# Patient Record
Sex: MALE | Race: BLACK OR AFRICAN AMERICAN | Employment: UNEMPLOYED | URBAN - METROPOLITAN AREA
[De-identification: names, ages, dates, MRNs, and addresses within clinical notes are randomized per-mention and may not be internally consistent; named-entity substitution may affect disease eponyms.]

---

## 2021-11-09 ENCOUNTER — HOSPITAL ENCOUNTER (INPATIENT)
Age: 31
LOS: 18 days | Discharge: HOME | DRG: 885 | End: 2021-11-27
Attending: EMERGENCY MEDICINE | Admitting: PSYCHIATRY & NEUROLOGY
Payer: MEDICAID

## 2021-11-09 DIAGNOSIS — F25.9 SCHIZOAFFECTIVE DISORDER, UNSPECIFIED TYPE (HCC): Primary | ICD-10-CM

## 2021-11-09 DIAGNOSIS — F31.2 BIPOLAR DISORDER, CURRENT EPISODE MANIC SEVERE WITH PSYCHOTIC FEATURES (HCC): ICD-10-CM

## 2021-11-09 LAB
ALBUMIN SERPL-MCNC: 4 G/DL (ref 3.5–5)
ALBUMIN/GLOB SERPL: 1.3 {RATIO} (ref 1.1–2.2)
ALP SERPL-CCNC: 84 U/L (ref 45–117)
ALT SERPL-CCNC: 24 U/L (ref 12–78)
AMPHET UR QL SCN: NEGATIVE
ANION GAP SERPL CALC-SCNC: 9 MMOL/L (ref 5–15)
APPEARANCE UR: CLEAR
AST SERPL-CCNC: 23 U/L (ref 15–37)
BACTERIA URNS QL MICRO: ABNORMAL /HPF
BARBITURATES UR QL SCN: NEGATIVE
BASOPHILS # BLD: 0 K/UL (ref 0–0.1)
BASOPHILS NFR BLD: 1 % (ref 0–1)
BENZODIAZ UR QL: NEGATIVE
BILIRUB SERPL-MCNC: 0.6 MG/DL (ref 0.2–1)
BILIRUB UR QL: NEGATIVE
BUN SERPL-MCNC: 14 MG/DL (ref 6–20)
BUN/CREAT SERPL: 15 (ref 12–20)
CALCIUM SERPL-MCNC: 8.6 MG/DL (ref 8.5–10.1)
CANNABINOIDS UR QL SCN: NEGATIVE
CHLORIDE SERPL-SCNC: 106 MMOL/L (ref 97–108)
CO2 SERPL-SCNC: 28 MMOL/L (ref 21–32)
COCAINE UR QL SCN: NEGATIVE
COLOR UR: ABNORMAL
CREAT SERPL-MCNC: 0.93 MG/DL (ref 0.7–1.3)
DIFFERENTIAL METHOD BLD: ABNORMAL
DRUG SCRN COMMENT,DRGCM: NORMAL
EOSINOPHIL # BLD: 0.1 K/UL (ref 0–0.4)
EOSINOPHIL NFR BLD: 2 % (ref 0–7)
EPITH CASTS URNS QL MICRO: ABNORMAL /LPF
ERYTHROCYTE [DISTWIDTH] IN BLOOD BY AUTOMATED COUNT: 13.4 % (ref 11.5–14.5)
ETHANOL SERPL-MCNC: <10 MG/DL
FLUAV RNA SPEC QL NAA+PROBE: NOT DETECTED
FLUBV RNA SPEC QL NAA+PROBE: NOT DETECTED
GLOBULIN SER CALC-MCNC: 3.2 G/DL (ref 2–4)
GLUCOSE SERPL-MCNC: 94 MG/DL (ref 65–100)
GLUCOSE UR STRIP.AUTO-MCNC: NEGATIVE MG/DL
HCT VFR BLD AUTO: 41.1 % (ref 36.6–50.3)
HGB BLD-MCNC: 13.3 G/DL (ref 12.1–17)
HGB UR QL STRIP: NEGATIVE
IMM GRANULOCYTES # BLD AUTO: 0 K/UL (ref 0–0.04)
IMM GRANULOCYTES NFR BLD AUTO: 0 % (ref 0–0.5)
KETONES UR QL STRIP.AUTO: ABNORMAL MG/DL
LEUKOCYTE ESTERASE UR QL STRIP.AUTO: NEGATIVE
LYMPHOCYTES # BLD: 3.7 K/UL (ref 0.8–3.5)
LYMPHOCYTES NFR BLD: 49 % (ref 12–49)
MCH RBC QN AUTO: 27 PG (ref 26–34)
MCHC RBC AUTO-ENTMCNC: 32.4 G/DL (ref 30–36.5)
MCV RBC AUTO: 83.4 FL (ref 80–99)
METHADONE UR QL: NEGATIVE
MONOCYTES # BLD: 0.5 K/UL (ref 0–1)
MONOCYTES NFR BLD: 7 % (ref 5–13)
MUCOUS THREADS URNS QL MICRO: ABNORMAL /LPF
NEUTS SEG # BLD: 3.1 K/UL (ref 1.8–8)
NEUTS SEG NFR BLD: 41 % (ref 32–75)
NITRITE UR QL STRIP.AUTO: NEGATIVE
NRBC # BLD: 0 K/UL (ref 0–0.01)
NRBC BLD-RTO: 0 PER 100 WBC
OPIATES UR QL: NEGATIVE
PCP UR QL: NEGATIVE
PH UR STRIP: 5.5 [PH] (ref 5–8)
PLATELET # BLD AUTO: 200 K/UL (ref 150–400)
PMV BLD AUTO: 10.6 FL (ref 8.9–12.9)
POTASSIUM SERPL-SCNC: 3.4 MMOL/L (ref 3.5–5.1)
PROT SERPL-MCNC: 7.2 G/DL (ref 6.4–8.2)
PROT UR STRIP-MCNC: NEGATIVE MG/DL
RBC # BLD AUTO: 4.93 M/UL (ref 4.1–5.7)
RBC #/AREA URNS HPF: ABNORMAL /HPF (ref 0–5)
SARS-COV-2, COV2: NOT DETECTED
SODIUM SERPL-SCNC: 143 MMOL/L (ref 136–145)
SP GR UR REFRACTOMETRY: >1.03 (ref 1–1.03)
UA: UC IF INDICATED,UAUC: ABNORMAL
UROBILINOGEN UR QL STRIP.AUTO: 1 EU/DL (ref 0.2–1)
WBC # BLD AUTO: 7.5 K/UL (ref 4.1–11.1)
WBC URNS QL MICRO: ABNORMAL /HPF (ref 0–4)

## 2021-11-09 PROCEDURE — 99284 EMERGENCY DEPT VISIT MOD MDM: CPT

## 2021-11-09 PROCEDURE — 80053 COMPREHEN METABOLIC PANEL: CPT

## 2021-11-09 PROCEDURE — 82077 ASSAY SPEC XCP UR&BREATH IA: CPT

## 2021-11-09 PROCEDURE — 80307 DRUG TEST PRSMV CHEM ANLYZR: CPT

## 2021-11-09 PROCEDURE — 81001 URINALYSIS AUTO W/SCOPE: CPT

## 2021-11-09 PROCEDURE — 85025 COMPLETE CBC W/AUTO DIFF WBC: CPT

## 2021-11-09 PROCEDURE — 87636 SARSCOV2 & INF A&B AMP PRB: CPT

## 2021-11-09 PROCEDURE — 36415 COLL VENOUS BLD VENIPUNCTURE: CPT

## 2021-11-09 PROCEDURE — 65270000029 HC RM PRIVATE

## 2021-11-10 PROBLEM — F31.2 BIPOLAR DISORDER, CURRENT EPISODE MANIC SEVERE WITH PSYCHOTIC FEATURES (HCC): Status: ACTIVE | Noted: 2021-11-10

## 2021-11-10 PROBLEM — F25.9 SCHIZOAFFECTIVE DISORDER (HCC): Status: RESOLVED | Noted: 2021-11-09 | Resolved: 2021-11-10

## 2021-11-10 PROCEDURE — 65220000003 HC RM SEMIPRIVATE PSYCH

## 2021-11-10 PROCEDURE — 99223 1ST HOSP IP/OBS HIGH 75: CPT | Performed by: PSYCHIATRY & NEUROLOGY

## 2021-11-10 RX ORDER — ADHESIVE BANDAGE
30 BANDAGE TOPICAL DAILY PRN
Status: DISCONTINUED | OUTPATIENT
Start: 2021-11-10 | End: 2021-11-27 | Stop reason: HOSPADM

## 2021-11-10 RX ORDER — ARIPIPRAZOLE 1 MG/ML
10 SOLUTION ORAL DAILY
Status: DISCONTINUED | OUTPATIENT
Start: 2021-11-10 | End: 2021-11-12

## 2021-11-10 RX ORDER — DIPHENHYDRAMINE HYDROCHLORIDE 50 MG/ML
50 INJECTION, SOLUTION INTRAMUSCULAR; INTRAVENOUS
Status: DISCONTINUED | OUTPATIENT
Start: 2021-11-10 | End: 2021-11-27 | Stop reason: HOSPADM

## 2021-11-10 RX ORDER — TRAZODONE HYDROCHLORIDE 50 MG/1
50 TABLET ORAL
Status: DISCONTINUED | OUTPATIENT
Start: 2021-11-10 | End: 2021-11-21

## 2021-11-10 RX ORDER — OLANZAPINE 5 MG/1
5 TABLET ORAL
Status: DISCONTINUED | OUTPATIENT
Start: 2021-11-10 | End: 2021-11-27 | Stop reason: HOSPADM

## 2021-11-10 RX ORDER — ACETAMINOPHEN 325 MG/1
650 TABLET ORAL
Status: DISCONTINUED | OUTPATIENT
Start: 2021-11-10 | End: 2021-11-27 | Stop reason: HOSPADM

## 2021-11-10 RX ORDER — VALPROIC ACID 250 MG/5ML
750 SOLUTION ORAL EVERY 12 HOURS
Status: DISCONTINUED | OUTPATIENT
Start: 2021-11-10 | End: 2021-11-12

## 2021-11-10 RX ORDER — HALOPERIDOL 5 MG/ML
5 INJECTION INTRAMUSCULAR
Status: DISCONTINUED | OUTPATIENT
Start: 2021-11-10 | End: 2021-11-27 | Stop reason: HOSPADM

## 2021-11-10 RX ORDER — LORAZEPAM 2 MG/ML
1 INJECTION INTRAMUSCULAR
Status: DISCONTINUED | OUTPATIENT
Start: 2021-11-10 | End: 2021-11-27 | Stop reason: HOSPADM

## 2021-11-10 RX ORDER — HYDROXYZINE 25 MG/1
50 TABLET, FILM COATED ORAL
Status: DISCONTINUED | OUTPATIENT
Start: 2021-11-10 | End: 2021-11-27 | Stop reason: HOSPADM

## 2021-11-10 RX ORDER — BENZTROPINE MESYLATE 1 MG/1
1 TABLET ORAL
Status: DISCONTINUED | OUTPATIENT
Start: 2021-11-10 | End: 2021-11-27 | Stop reason: HOSPADM

## 2021-11-10 NOTE — BH NOTES
Report received from off going nurse, assumed care of resident. Pt is alert to person only, he states that he has no idea why he was brought into the hospital. PT is aggressive and hostile. He requires redirection for yelling when approached. Pt denies SI/HI, AVH, depression and anxiety. 1000-Pt is currently pacing in the hallway. Pt ignores everyone who asks him questions. No other issues to note at this time. 1200-Pt refused medication, information was given on med compliance. 1400-Pt is meal compliant, Still irritable and labile. Monitoring will continue    1600-Pacing in hallway, denies all pain and discomfort. Will continue to monitor.

## 2021-11-10 NOTE — PROGRESS NOTES
Behavioral Services  Medicare Certification Upon Admission    I certify that this patient's inpatient psychiatric hospital admission is medically necessary for:    [x] (1) Treatment which could reasonably be expected to improve this patient's condition,       [x] (2) Or for diagnostic study;     AND     [x](2) The inpatient psychiatric services are provided while the individual is under the care of a physician and are included in the individualized plan of care.     Estimated length of stay/service 5-7 days    Plan for post-hospital care home    Electronically signed by Mala Cervantes MD on 11/10/2021 at 8:41 AM

## 2021-11-10 NOTE — BH NOTES
GROUP THERAPY PROGRESS NOTE    Patient did not participate in Coping Skills group.      TAMY Stevenson

## 2021-11-10 NOTE — ED NOTES
Pt belongings inventory:    -2 debit cards  -1 family first card  -[employer] car keys  -Empty pack of cigarretes  -2 lighters  -0.25 cents  -CPD paperwork    -Black tank top  -Black Fraternity shirt  -Black Hoodie  -Black athletic pants  -Sneakers

## 2021-11-10 NOTE — PROGRESS NOTES
Problem: Psychosis  Goal: *STG: Remains safe in hospital  Outcome: Progressing Towards Goal  Goal: *STG/LTG: Complies with medication therapy  11/10/2021 0951 by Lindsay Fowler RN  Outcome: Progressing Towards Goal  11/10/2021 0950 by Lindsay Fowler RN  Outcome: Progressing Towards Goal

## 2021-11-10 NOTE — GROUP NOTE
SERA  GROUP DOCUMENTATION INDIVIDUAL                                                                          Group Therapy Note    Date: 11/10/2021    Group Start Time: 1000  Group End Time: 1100  Group Topic: Topic Group    [facility] [place] [address] ACUTE BEHAV [facility]    [place], [address] GROUP    Group Therapy Note    Attendees: 6         Attendance: Did not attend    Patient's Goal:      Interventions/techniques:  Reather Seal

## 2021-11-10 NOTE — PROGRESS NOTES
Patient is an 27year old  male admitted to acute unit with a primary diagnosis of Schizoaffective disorder. Patient admitted under an TDO admission. Patient is oriented to self only. Patient presented as guarded with limited engagement. Patient denied any symptoms of depression or anxiety. Patient denied hallucinations of any type and/or thoughts of self harm or harm to others. Patient admitted to due to delusional thought process. According to intake documentation, patient was pulled over by the police after calls of a suspicious vehicle. Patient identified himself as his mother and stated that he was traveling in to [place]. Patient unable to conceptualize that his car was out of gas. Patient has an documented hx of Schizophrenia. Patient has been arrested by [employer] for his attempts to visit the president. Patient's UDS negative of all substances. BAL 0.000. Patient uncooperative during admission process. Patient unable to answer detailed admission questions. Patient oriented to unit. Admission packet and confidentiality code located in patient chart. Patient unable to sign admission documentation during intake. Admission documentation located in chart. Q 15 min checks initiated for safety. Staff will continue to assess and monitor.         Problem: Psychosis  Goal: *STG: Decreased hallucinations  Outcome: Progressing Towards Goal  Goal: *STG: Decreased delusional thinking  Outcome: Progressing Towards Goal  Goal: *STG: Remains safe in hospital  Outcome: Progressing Towards Goal

## 2021-11-10 NOTE — GROUP NOTE
SERA  GROUP DOCUMENTATION INDIVIDUAL                                                                          Group Therapy Note    Date: 11/10/2021    Group Start Time: 1400  Group End Time: 1500  Group Topic: Recreational/Music Therapy    [facility] [place] [address] ACUTE BEHAV [facility]    [place], [address] [facility] GROUP DOCUMENTATION GROUP    Group Therapy Note    Attendees: 8         Attendance: Attended    Patient's Goal:  To concentrate on selected task    Interventions/techniques: Supported-crafts,games,music    Interactions: Minimal    Mental Status: Flat and Preoccupied    Behavior/appearance: Needed prompting and Poor eye contact    Goals Achieved:        Additional Notes:  Quiet/withdrawn-declined active participation-left early    Nancy Martinez

## 2021-11-10 NOTE — PROGRESS NOTES
Laboratory monitoring for mood stabilizer and antipsychotics:    Recommended baseline monitoring has been completed based on this patient's current medication regimen. The patient is currently taking the following medication(s):   Current Facility-Administered Medications   Medication Dose Route Frequency    ARIPiprazole (ABILIFY) 1 mg/mL oral soln 15 mg  15 mg Oral DAILY    Or    haloperidol lactate (HALDOL) injection 5 mg +++COURT ORDERED MEDICATION FOR REFUSAL OF ORAL ARIPIPRAZOLE+++  5 mg IntraMUSCular DAILY    valproic acid (as sodium salt) (DEPAKENE) 250 mg/5 mL (5 mL) oral solution 750 mg  750 mg Oral Q12H    Or    LORazepam (ATIVAN) injection 1 mg +++COURT ORDERED MEDICATION FOR REFUSAL OF VALPROIC ACID+++  1 mg IntraMUSCular Q12H       Serum Drug Level(s)  VALPROIC ACID  Recent Labs     11/17/21  0546   VALP 82         Height, Weight, BMI Estimation  Estimated body mass index is 27.12 kg/m² as calculated from the following:    Height as of this encounter: 182.9 cm (72\"). Weight as of this encounter: 90.7 kg (200 lb). Renal Function, Hepatic Function and Chemistry  Estimated Creatinine Clearance: 125.2 mL/min (by C-G formula based on SCr of 0.93 mg/dL). Lab Results   Component Value Date/Time    Sodium 143 11/09/2021 08:31 PM    Potassium 3.4 (L) 11/09/2021 08:31 PM    Chloride 106 11/09/2021 08:31 PM    CO2 28 11/09/2021 08:31 PM    Anion gap 9 11/09/2021 08:31 PM    Glucose 94 11/09/2021 08:31 PM    BUN 14 11/09/2021 08:31 PM    Creatinine 0.93 11/09/2021 08:31 PM    BUN/Creatinine ratio 15 11/09/2021 08:31 PM    GFR est AA >60 11/09/2021 08:31 PM    GFR est non-AA >60 11/09/2021 08:31 PM    Calcium 8.6 11/09/2021 08:31 PM    ALT (SGPT) 24 11/09/2021 08:31 PM    Alk.  phosphatase 84 11/09/2021 08:31 PM    Protein, total 7.2 11/09/2021 08:31 PM    Albumin 4.0 11/09/2021 08:31 PM    Globulin 3.2 11/09/2021 08:31 PM    A-G Ratio 1.3 11/09/2021 08:31 PM    Bilirubin, total 0.6 11/09/2021 08:31 PM       Lab Results   Component Value Date/Time    Glucose 94 11/09/2021 08:31 PM       Lab Results   Component Value Date/Time    Hemoglobin A1c 5.5 11/17/2021 05:48 AM       Hematology  Lab Results   Component Value Date/Time    WBC 7.5 11/09/2021 08:31 PM    HGB 13.3 11/09/2021 08:31 PM    HCT 41.1 11/09/2021 08:31 PM    PLATELET 874 60/52/5749 08:31 PM    MCV 83.4 11/09/2021 08:31 PM       Lipids  Lab Results   Component Value Date/Time    Cholesterol, total 196 11/17/2021 05:46 AM    HDL Cholesterol 54 11/17/2021 05:46 AM    LDL, calculated 122 (H) 11/17/2021 05:46 AM    Triglyceride 100 11/17/2021 05:46 AM    CHOL/HDL Ratio 3.6 11/17/2021 05:46 AM       Thyroid Function  Lab Results   Component Value Date/Time    TSH 1.18 11/17/2021 05:46 AM       Vitals  Visit Vitals  /75 (BP 1 Location: Left upper arm, BP Patient Position: Sitting)   Pulse 81   Temp 97.7 °F (36.5 °C)   Resp 17   Ht 182.9 cm (72\")   Wt 90.7 kg (200 lb)   SpO2 99%   BMI 27.12 kg/m²       Svetlana Cruz, [address], BCPS  [phone] (pharmacy)

## 2021-11-10 NOTE — ED NOTES
Per Bob LOUIS, Pt car is located at [address] [place] [address] [facility] in front of a residence. Homeowners are aware.

## 2021-11-10 NOTE — ED PROVIDER NOTES
EMERGENCY DEPARTMENT HISTORY AND PHYSICAL EXAM      Date: 11/9/2021  Patient Name: Jackeline Lorenzana    History of Presenting Illness     Chief Complaint   Patient presents with    Mental Health Problem     ECO with [place] [employer]       History Provided By: Patient    Additional History (Context): Jackeline Lorenzana is a 27 y.o. male with No significant past medical history who presents with mental health problem. Pt states he was pulled over and the next thing he noticed he was in handcuffs. He states nothing is wrong with him medically. Denies hx of psychiatric conditions. Denies delusions, hallucinations ,SI, HI. Pt states he is not on medication. He states \" if society was okay then people would not have psychological conditions\". Per [place] police, pt is a missing person and from [place]. Per pt, he was on his way to [place]. PCP: None        Past History     Past Medical History:  History reviewed. No pertinent past medical history. Past Surgical History:  History reviewed. No pertinent surgical history. Family History:  History reviewed. No pertinent family history. Social History:  Social History     Tobacco Use    Smoking status: Current Some Day Smoker    Smokeless tobacco: Not on file   Substance Use Topics    Alcohol use: Not Currently    Drug use: Not Currently       Allergies:  No Known Allergies      Review of Systems   Review of Systems   Constitutional: Negative for chills and fever. HENT: Negative for congestion, rhinorrhea and sore throat. Respiratory: Negative for cough and shortness of breath. Cardiovascular: Negative for chest pain and leg swelling. Gastrointestinal: Negative for abdominal pain, constipation, diarrhea, nausea and vomiting. Genitourinary: Negative for dysuria, frequency and urgency. Musculoskeletal: Negative for back pain, gait problem and neck pain. Skin: Negative for wound. Neurological: Negative for dizziness, numbness and headaches. Psychiatric/Behavioral: Negative for agitation, confusion, hallucinations, sleep disturbance and suicidal ideas. The patient is not nervous/anxious and is not hyperactive. All other systems reviewed and are negative. Physical Exam     Vitals:    11/09/21 1940   BP: (!) 120/96   Pulse: 62   Resp: 20   Temp: 98.2 °F (36.8 °C)   SpO2: 95%   Weight: 90.7 kg (200 lb)   Height: 6' (1.829 m)     Physical Exam  Vitals and nursing note reviewed. Constitutional:       General: He is not in acute distress. Appearance: He is well-developed. He is not ill-appearing. HENT:      Head: Normocephalic and atraumatic. Right Ear: Tympanic membrane, ear canal and external ear normal.      Left Ear: Tympanic membrane, ear canal and external ear normal.      Nose: Nose normal.      Mouth/Throat:      Mouth: Mucous membranes are moist.      Pharynx: Oropharynx is clear. No oropharyngeal exudate or posterior oropharyngeal erythema. Eyes:      Extraocular Movements: Extraocular movements intact. Conjunctiva/sclera: Conjunctivae normal.      Pupils: Pupils are equal, round, and reactive to light. Cardiovascular:      Rate and Rhythm: Normal rate and regular rhythm. Pulses: Normal pulses. Heart sounds: Normal heart sounds. Pulmonary:      Effort: Pulmonary effort is normal.      Breath sounds: Normal breath sounds. Abdominal:      General: Bowel sounds are normal. There is no distension. Palpations: Abdomen is soft. Tenderness: There is no abdominal tenderness. There is no guarding or rebound. Musculoskeletal:      Cervical back: Normal range of motion and neck supple. Lymphadenopathy:      Cervical: No cervical adenopathy. Skin:     General: Skin is warm and dry. Neurological:      Mental Status: He is alert and oriented to person, place, and time. GCS: GCS eye subscore is 4. GCS verbal subscore is 5. GCS motor subscore is 6. Cranial Nerves: No cranial nerve deficit. Psychiatric:         Attention and Perception: Attention and perception normal.         Mood and Affect: Mood and affect normal.         Speech: Speech normal.         Behavior: Behavior is agitated. Behavior is cooperative. Thought Content: Thought content normal.           Diagnostic Study Results     Labs -     Recent Results (from the past 12 hour(s))   CBC WITH AUTOMATED DIFF    Collection Time: 11/09/21  8:31 PM   Result Value Ref Range    WBC 7.5 4.1 - 11.1 K/uL    RBC 4.93 4.10 - 5.70 M/uL    HGB 13.3 12.1 - 17.0 g/dL    HCT 41.1 36.6 - 50.3 %    MCV 83.4 80.0 - 99.0 FL    MCH 27.0 26.0 - 34.0 PG    MCHC 32.4 30.0 - 36.5 g/dL    RDW 13.4 11.5 - 14.5 %    PLATELET 165 402 - 602 K/uL    MPV 10.6 8.9 - 12.9 FL    NRBC 0.0 0  WBC    ABSOLUTE NRBC 0.00 0.00 - 0.01 K/uL    NEUTROPHILS 41 32 - 75 %    LYMPHOCYTES 49 12 - 49 %    MONOCYTES 7 5 - 13 %    EOSINOPHILS 2 0 - 7 %    BASOPHILS 1 0 - 1 %    IMMATURE GRANULOCYTES 0 0.0 - 0.5 %    ABS. NEUTROPHILS 3.1 1.8 - 8.0 K/UL    ABS. LYMPHOCYTES 3.7 (H) 0.8 - 3.5 K/UL    ABS. MONOCYTES 0.5 0.0 - 1.0 K/UL    ABS. EOSINOPHILS 0.1 0.0 - 0.4 K/UL    ABS. BASOPHILS 0.0 0.0 - 0.1 K/UL    ABS. IMM. GRANS. 0.0 0.00 - 0.04 K/UL    DF AUTOMATED     METABOLIC PANEL, COMPREHENSIVE    Collection Time: 11/09/21  8:31 PM   Result Value Ref Range    Sodium 143 136 - 145 mmol/L    Potassium 3.4 (L) 3.5 - 5.1 mmol/L    Chloride 106 97 - 108 mmol/L    CO2 28 21 - 32 mmol/L    Anion gap 9 5 - 15 mmol/L    Glucose 94 65 - 100 mg/dL    BUN 14 6 - 20 MG/DL    Creatinine 0.93 0.70 - 1.30 MG/DL    BUN/Creatinine ratio 15 12 - 20      GFR est AA >60 >60 ml/min/1.73m2    GFR est non-AA >60 >60 ml/min/1.73m2    Calcium 8.6 8.5 - 10.1 MG/DL    Bilirubin, total 0.6 0.2 - 1.0 MG/DL    ALT (SGPT) 24 12 - 78 U/L    AST (SGOT) 23 15 - 37 U/L    Alk.  phosphatase 84 45 - 117 U/L    Protein, total 7.2 6.4 - 8.2 g/dL    Albumin 4.0 3.5 - 5.0 g/dL    Globulin 3.2 2.0 - 4.0 g/dL    A-G Ratio 1.3 1.1 - 2.2     ETHYL ALCOHOL    Collection Time: 11/09/21  8:31 PM   Result Value Ref Range    ALCOHOL(ETHYL),SERUM <10 <10 MG/DL   COVID-19 WITH INFLUENZA A/B    Collection Time: 11/09/21  8:42 PM   Result Value Ref Range    SARS-CoV-2 Not detected NOTD      Influenza A by PCR Not detected      Influenza B by PCR Not detected     DRUG SCREEN, URINE    Collection Time: 11/09/21 10:06 PM   Result Value Ref Range    AMPHETAMINES Negative NEG      BARBITURATES Negative NEG      BENZODIAZEPINES Negative NEG      COCAINE Negative NEG      METHADONE Negative NEG      OPIATES Negative NEG      PCP(PHENCYCLIDINE) Negative NEG      THC (TH-CANNABINOL) Negative NEG      Drug screen comment (NOTE)    URINALYSIS W/ REFLEX CULTURE    Collection Time: 11/09/21 10:06 PM    Specimen: Urine   Result Value Ref Range    Color DARK YELLOW      Appearance CLEAR CLEAR      Specific gravity >1.030 (H) 1.003 - 1.030    pH (UA) 5.5 5.0 - 8.0      Protein Negative NEG mg/dL    Glucose Negative NEG mg/dL    Ketone TRACE (A) NEG mg/dL    Bilirubin Negative NEG      Blood Negative NEG      Urobilinogen 1.0 0.2 - 1.0 EU/dL    Nitrites Negative NEG      Leukocyte Esterase Negative NEG      WBC 0-4 0 - 4 /hpf    RBC 0-5 0 - 5 /hpf    Epithelial cells FEW FEW /lpf    Bacteria 1+ (A) NEG /hpf    UA:UC IF INDICATED CULTURE NOT INDICATED BY UA RESULT CNI      Mucus 2+ (A) NEG /lpf       Radiologic Studies -   No orders to display     CT Results  (Last 48 hours)    None        CXR Results  (Last 48 hours)    None            Medical Decision Making   I am the first provider for this patient. I reviewed the vital signs, available nursing notes, past medical history, past surgical history, family history and social history. Vital Signs-Reviewed the patient's vital signs. Records Reviewed: Nursing Notes and Old Medical Records       ED COURSE:   Initial assessment performed.  The patients presenting problems have been discussed, and they are in agreement with the care plan formulated and outlined with them. I have encouraged them to ask questions as they arise throughout their visit. DDX: Acute Psychosis, Schizophrenia, Bipolar, Substance Abuse   ED Course:   ED Course as of 11/10/21 0037   Tue Nov 09, 2021   2241 Progress Note: pt is medically cleared  [NA]   Wed Nov 10, 2021   0000 Progress Note:   Pt accepted by Dr Luigi Duenas for admission  [NA]      ED Course User Index  [NA] Alfonzo Garcia NP         Disposition:  Admit       Follow-up Information    None         There are no discharge medications for this patient. Provider Notes (Medical Decision Making):     Procedures:  Procedures  Diagnosis     Clinical Impression:   1.  Schizoaffective disorder, unspecified type ([facility] [address]

## 2021-11-10 NOTE — BH NOTES
GROUP THERAPY PROGRESS NOTE    Patient did not participate in Discharge Planning Group.      TAMY Self

## 2021-11-10 NOTE — ED NOTES
TRANSFER - OUT REPORT:    Verbal report given to Ferdinand Dupree RN (name) on Femi Solo  being transferred to [facility] (unit) for routine progression of care       Report consisted of patients Situation, Background, Assessment and   Recommendations(SBAR). Information from the following report(s) SBAR, ED Summary and Recent Results was reviewed with the receiving nurse. Lines:       Opportunity for questions and clarification was provided.       Patient transported with:  Stefan Show

## 2021-11-10 NOTE — BH NOTES
PSYCHOSOCIAL ASSESSMENT  :Patient identifying info:   Agnes Ramirez is a 27 y.o., male admitted 11/9/2021  7:43 PM     Presenting problem and precipitating factors: Pt was admitted on a TDO status and committed during hearing. Per prescreening pt was disoriented, from [place] and was reported as a missing person. Pt states they were visiting family in [place] but were returning to [facility] where they report they live. Pt was a poor historian during th Pt does not give permission for staff to speak with family at this time. Mother provided information that pt was diagnosed with schizophrenia years ago and has a history of treatment and medication noncompliance - most recently on Haldol, Calypta and Depakote. He has a history of cheeking medication and needs a CHOU. Per prescreening, pt has been arrested by [employer] for attempting to visit the President. Mental status assessment: Pt's mood is depressed, affect is blunted. Pt's thought process is illogical. Pt's insight and judgment is poor, reliability is poor. Social work department will continue to coordinate discharge plans. Strengths: Family involvement     Collateral information: Pt refusing to sign DINO    Current psychiatric /substance abuse providers and contact info: None    Previous psychiatric/substance abuse providers and response to treatment: Pt denies. Mother reports multiple psych admissions and history of medication and treatment noncompliance.      Family history of mental illness or substance abuse: unknown     Substance abuse history:  UDS negative; BAL=0  Social History     Tobacco Use    Smoking status: Current Some Day Smoker    Smokeless tobacco: Not on file   Substance Use Topics    Alcohol use: Not Currently       History of biomedical complications associated with substance abuse: none reported    Patient's current acceptance of treatment or motivation for change: Poor at this time    Family constellation: Single and without children     Is significant other involved? N/A    Describe support system: Mother states she is very involved in his life    Describe living arrangements and home environment: Pt reports living alone in [facility]  Never Reviewed          Codes Class Noted POA    * (Principal) [facility] ICD-10-CM: F25.9  ICD-9-CM: 295.70  2021 Unknown            Trauma history: None    Legal issues:  None reported. History of  service:  None reported. Financial status:  Unknown at this time     Sabianism/cultural factors: Unknown     Education/work history: Degree from [place]     Have you been licensed as a health care professional (current or ): No.    Leisure and recreation preferences: Unknown. Describe coping skills:  Unknown at this time.     Lynda Boss  11/10/2021

## 2021-11-10 NOTE — ED NOTES
Pt ambulatory to restroom with [facility]. No restraints in place. Officer did not notify RN that pt needed to use restroom so no urine obtained at this time.

## 2021-11-10 NOTE — ED NOTES
Hourly rounding completed on pt. Pt resting comfortably on stretcher with blanket. No requests at this time. [employer] at bedside.

## 2021-11-10 NOTE — ED NOTES
Pt seemingly agitated with [facility] and repeatedly asking for something to eat. Pt redirectable at this time. This RN explained to pt that we would like to evaluate him first and obtain lab work then we can provide pt with food.

## 2021-11-10 NOTE — PROGRESS NOTES
[facility] Admission Pharmacy Medication Reconciliation     Information obtained from: chart review  RxQuery data available1: No    Comments/recommendations:  Patient is not currently taking any medications. Reported to crisis  that [place] doctor told him he was schizophrenic and prescribed medications that he never took\"  Patient did not respond to treatment team questions regarding any current medications or past treatments. Medication changes (since last review): None     1RxQuery pharmacy benefit data reflects medications filled and processed through the patient's insurance, however                this data does NOT capture whether the medication was picked up or is currently being taken by the patient. Total Time Spent: 5 minutes    Past Medical History/Disease States:  History reviewed. No pertinent past medical history. Patient allergies:    Allergies as of 11/09/2021    (No Known Allergies)       Prior to admission medications:   None        Thank you,  MARVEL Walls [facility] Specialist, [address] [place] [address] [address]: [phone] ([phone]  Pharmacy: [phone]

## 2021-11-10 NOTE — BH NOTES
GROUP THERAPY PROGRESS NOTE    Patient did not participate in Coping Skills group.     Martín Gordon, MSW

## 2021-11-10 NOTE — ED NOTES
Pt presents to ED under ECO with [facility]. Pt is reportedly currently a \"missing person\" from the [facility]. Pt states he is on his way to [place], [place] but had some car trouble en route. Pt is fixated on needing to leave so he can head to [place] stating, \"I have places to go. \" Police reportedly found pt and pt was disoriented to time and place. Pt is cooperative after some prompting but irritable. Pt denies SI/HI/AVTH but was hesitant with answers. Pt denies any ETOH or drug use. [place] [employer] at bedside and pt arrived with forensic restraints applied to bilateral wrists behind pt's back. No skin breakdown noted. Forensic restraints are removed at this time. Pt changed into paper scrubs. [place] provided for comfort. Mother's Contact: [phone]      Emergency Department Nursing Plan of Care       The Nursing Plan of Care is developed from the Nursing assessment and Emergency Department Attending provider initial evaluation. The plan of care may be reviewed in the ED Provider note.     The Plan of Care was developed with the following considerations:   Patient / Family readiness to learn indicated by:verbalized understanding  Persons(s) to be included in education: patient  Barriers to Learning/Limitations:No    Signed     Too Grissom RN    11/9/2021   7:59 PM

## 2021-11-11 PROCEDURE — 99232 SBSQ HOSP IP/OBS MODERATE 35: CPT | Performed by: PSYCHIATRY & NEUROLOGY

## 2021-11-11 PROCEDURE — 65220000003 HC RM SEMIPRIVATE PSYCH

## 2021-11-11 NOTE — GROUP NOTE
SERA  GROUP DOCUMENTATION INDIVIDUAL                                                                          Group Therapy Note    Date: 11/11/2021    Group Start Time: 1000  Group End Time: 1100  Group Topic: Topic Group    [facility] [place] 3 ACUTE BEHAV [facility]    [place], [address]    Group Therapy Note    Attendees: 7         Attendance: Did not attend    Patient's Goal:      Interventions/techniques:Nyasia Brush

## 2021-11-11 NOTE — GROUP NOTE
SERA  GROUP DOCUMENTATION INDIVIDUAL                                                                          Group Therapy Note    Date: 11/11/2021    Group Start Time: 1400  Group End Time: 1500  Group Topic: Recreational/Music Therapy    [address] [facility] 3 ACUTE BEHAV [facility], [address] [facility] GROUP DOCUMENTATION GROUP    Group Therapy Note    Attendees: 9         Attendance: Attended    Patient's Goal:  To concentrate on selected task    Interventions/techniques: Supported-crafts,games,music    Interactions: Minimal    Mental Status: Flat and Preoccupied    Behavior/appearance: Needed prompting    Goals Achieved:        Additional Notes:  Pt declined active participation-left session early    Xiomara Guthrie

## 2021-11-11 NOTE — BH NOTES
PSYCHIATRIC PROGRESS NOTE         Patient Name  Jaimee Ellis   Date of Birth 1990   [facility] [id number]   Medical Record Number  [id number]      Age  27 y.o. PCP None   Admit date:  11/9/2021    Room Number  360/37  @ [facility]   Date of Service  11/11/2021         E & M PROGRESS NOTE:         HISTORY       CC:  \"psychosis\"  HISTORY OF PRESENT ILLNESS/INTERVAL HISTORY:  (reviewed/updated 11/11/2021). per initial evaluation: The patient, Jaimee Ellis, is a 27 y.o. BLACK/ male with a past psychiatric history significant for schizoaffective vs bipolar disorder, who presents at this time with complaints of (and/or evidence of) the following emotional symptoms: psychotic behavior and eliseo. Additional symptomatology include poor self care. The above symptoms have been present for 2+ weeks. These symptoms are of moderate to high severity. These symptoms are constant in nature. The patient's condition has been precipitated by psychosocial stressors. Patient's condition made worse by treatment noncompliance. UDS: negative; BAL=0.     Per admission documentation, the patient was picked up by police following suspicious car report; he had run out of gas en route to [place] and was unable to fully explain his situation. Patient has been guarded and uncooperative on the unit, pacing the hallway and responding to internal stimuli. Patient grossly incoherent on interview, dismissive and suspicious.       The patient is a poor and uncooperative historian. The patient corroborates the above narrative. The patient contracts for safety on the unit and gives consent for the team to contact collateral. The patient is not amenable to initiating treatment while on the unit. Patient states he ran out of gas and offers little information regarding how he came to be in [place] or why he was traveling to [place] from his native [place] -- he states he lives in [place] though this is not corroborated.  Patient ends interview abruptly and continues to pace the unit. Per collateral, patient has not been compliant with medications in the past and has had prior episodes of psychosis. 11/11 - the patient slept 4 hours overnight. He remains labile and disorganized, with inappropriate actions such as masturbating at he nurses' station. Patient refused medications and labwork, still pacing the unit with an odd affect. No PRNs given as patient is verbally redirectable, no physical aggression. Court order pending for 11/12 for forced medication during TDO hearing. SIDE EFFECTS: (reviewed/updated 11/11/2021)  None reported or admitted to. ALLERGIES:(reviewed/updated 11/11/2021)  No Known Allergies   MEDICATIONS PRIOR TO ADMISSION:(reviewed/updated 11/11/2021)  No medications prior to admission. PAST MEDICAL HISTORY: Past medical history from the initial psychiatric evaluation has been reviewed (reviewed/updated 11/11/2021) with no additional updates (I asked patient and no additional past medical history provided). History reviewed. No pertinent past medical history. History reviewed. No pertinent surgical history. SOCIAL HISTORY: Social history from the initial psychiatric evaluation has been reviewed (reviewed/updated 11/11/2021) with no additional updates (I asked patient and no additional social history provided).  Social History     Socioeconomic History    Marital status: SINGLE     Spouse name: Not on file    Number of children: Not on file    Years of education: Not on file    Highest education level: Not on file   Occupational History    Not on file   Tobacco Use    Smoking status: Current Some Day Smoker    Smokeless tobacco: Not on file   Substance and Sexual Activity    Alcohol use: Not Currently    Drug use: Not Currently    Sexual activity: Not Currently   Other Topics Concern    Not on file   Social History Narrative    Not on file     Social Determinants of Health     Financial Resource Strain:     Difficulty of Paying Living Expenses: Not on file   Food Insecurity:     Worried About Running Out of Food in the Last Year: Not on file    Ana of Food in the Last Year: Not on file   Transportation Needs:     Lack of Transportation (Medical): Not on file    Lack of Transportation (Non-Medical): Not on file   Physical Activity:     Days of Exercise per Week: Not on file    Minutes of Exercise per Session: Not on file   Stress:     Feeling of Stress : Not on file   Social Connections:     Frequency of Communication with Friends and Family: Not on file    Frequency of Social Gatherings with Friends and Family: Not on file    Attends Jewish Services: Not on file    Active Member of [address] [place] [address] [employer] or Organizations: Not on file    Attends Club or Organization Meetings: Not on file    Marital Status: Not on file   Intimate Partner Violence:     Fear of Current or Ex-Partner: Not on file    Emotionally Abused: Not on file    Physically Abused: Not on file    Sexually Abused: Not on file   Housing Stability:     Unable to Pay for Housing in the Last Year: Not on file    Number of [place] in the Last Year: Not on file    Unstable Housing in the Last Year: Not on file      FAMILY HISTORY: Family history from the initial psychiatric evaluation has been reviewed (reviewed/updated 11/11/2021) with no additional updates (I asked patient and no additional family history provided). History reviewed. No pertinent family history.     REVIEW OF SYSTEMS: (reviewed/updated 11/11/2021)  Appetite:no change from normal   Sleep: poor with DIMS (difficulty initiating & maintaining sleep)   All other Review of Systems: Negative except per HPI         [address] [facility] (MSE):    MSE FINDINGS ARE WITHIN NORMAL LIMITS (WNL) UNLESS OTHERWISE STATED BELOW. ( ALL OF THE BELOW CATEGORIES OF THE MSE HAVE BEEN REVIEWED (reviewed 11/11/2021) AND UPDATED AS DEEMED APPROPRIATE )  General Presentation age appropriate, guarded and uncooperative   Orientation disorganized, oriented to time, place and person   Vital Signs  See below (reviewed 11/11/2021); Vital Signs (BP, Pulse, & Temp) are within normal limits if not listed below. Gait and Station Stable/steady, no ataxia   Musculoskeletal System No extrapyramidal symptoms (EPS); no abnormal muscular movements or Tardive Dyskinesia (TD); muscle strength and tone are within normal limits   Language No aphasia or dysarthria   Speech:  hypoverbal and increased latency of response   Thought Processes illogical; normal rate of thoughts; poor abstract reasoning/computation   Thought Associations loose associations   Thought Content preoccupations and internally preoccupied   Suicidal Ideations none   Homicidal Ideations none   Mood:  hostile  and irritable   Affect:  constricted and increased in intensity   Memory recent  fair   Memory remote:  intact   Concentration/Attention:  intact   Fund of Knowledge average   Insight:  poor   Reliability fair   Judgment:  poor          VITALS:     Patient Vitals for the past 24 hrs:   Resp   11/10/21 1930 16     Wt Readings from Last 3 Encounters:   11/10/21 90.7 kg (200 lb)     Temp Readings from Last 3 Encounters:   No data found for Temp     BP Readings from Last 3 Encounters:   11/10/21 129/75     Pulse Readings from Last 3 Encounters:   11/10/21 81            DATA     LABORATORY DATA:(reviewed/updated 11/11/2021)  No results found for this or any previous visit (from the past 24 hour(s)). No results found for: VALF2, VALAC, VALP, VALPR, DS6, CRBAM, CRBAMP, CARB2, XCRBAM  No results found for: LITHM   RADIOLOGY REPORTS:(reviewed/updated 11/11/2021)  No results found.        MEDICATIONS     ALL MEDICATIONS:   Current Facility-Administered Medications   Medication Dose Route Frequency    OLANZapine (ZyPREXA) tablet 5 mg  5 mg Oral Q6H PRN    haloperidol lactate (HALDOL) injection 5 mg  5 mg IntraMUSCular Q6H PRN  benztropine (COGENTIN) tablet 1 mg  1 mg Oral BID PRN    diphenhydrAMINE (BENADRYL) injection 50 mg  50 mg IntraMUSCular BID PRN    hydrOXYzine HCL (ATARAX) tablet 50 mg  50 mg Oral TID PRN    LORazepam (ATIVAN) injection 1 mg  1 mg IntraMUSCular Q4H PRN    traZODone (DESYREL) tablet 50 mg  50 mg Oral QHS PRN    acetaminophen (TYLENOL) tablet 650 mg  650 mg Oral Q4H PRN    magnesium hydroxide (MILK OF MAGNESIA) 400 mg/5 mL oral suspension 30 mL  30 mL Oral DAILY PRN    ARIPiprazole (ABILIFY) 1 mg/mL oral soln 10 mg  10 mg Oral DAILY    valproic acid (as sodium salt) (DEPAKENE) 250 mg/5 mL (5 mL) oral solution 750 mg  750 mg Oral Q12H      SCHEDULED MEDICATIONS:   Current Facility-Administered Medications   Medication Dose Route Frequency    ARIPiprazole (ABILIFY) 1 mg/mL oral soln 10 mg  10 mg Oral DAILY    valproic acid (as sodium salt) (DEPAKENE) 250 mg/5 mL (5 mL) oral solution 750 mg  750 mg Oral Q12H          ASSESSMENT & PLAN     DIAGNOSES REQUIRING ACTIVE TREATMENT AND MONITORING: (reviewed/updated 11/11/2021)  Patient Active Hospital Problem List:   Assessment: patient with decompensation in the field secondary to medication non-compliance. He is significantly impaired and will require stabilization with typical eliseo agents. Plan:  - CONTINUE Abilify oral soln 10 mg QDAY for psychosis (refusing)  - CONTINUE Depakote oral soln 750 mg Q12H for eliseo (refusing)  - Court order pending  - IGM therapy as tolerated  - Expand database / obtain collateral  - Dispo planning (home when stable)     I will continue to monitor blood levels (valproic acid---a drug with a narrow therapeutic index= NTI) and associated labs for drug therapy implemented that require intense monitoring for toxicity as deemed appropriate based on current medication side effects and pharmacodynamically determined drug 1/2 lives.     In summary, Jackeline Lorenzana, is a 27 y.o.  male who presents with a severe exacerbation of the principal diagnosis of Bipolar disorder, current episode manic severe with psychotic features ([facility] [address]    Patient's condition is not improving. Patient requires continued inpatient hospitalization for further stabilization, safety monitoring and medication management. I will continue to coordinate the provision of individual, milieu, occupational, group, and substance abuse therapies to address target symptoms/diagnoses as deemed appropriate for the individual patient. A coordinated, multidisplinary treatment team round was conducted with the patient (this team consists of the nurse, psychiatric unit pharmacist,  and writer). Complete current electronic health record for patient has been reviewed today including consultant notes, ancillary staff notes, nurses and psychiatric tech notes. Suicide risk assessment completed and patient deemed to be of low risk for suicide at this time. The following regarding medications was addressed during rounds with patient:   the risks and benefits of the proposed medication. The patient was given the opportunity to ask questions. Informed consent given to the use of the above medications. Will continue to adjust psychiatric and non-psychiatric medications (see above \"medication\" section and orders section for details) as deemed appropriate & based upon diagnoses and response to treatment. I will continue to order blood tests/labs and diagnostic tests as deemed appropriate and review results as they become available (see orders for details and above listed lab/test results). I will order psychiatric records from previous [facility] hospitals to further elucidate the nature of patient's psychopathology and review once available. I will gather additional collateral information from friends, family and o/p treatment team to further elucidate the nature of patient's psychopathology and baselline level of psychiatric functioning.          I certify that this patient's inpatient psychiatric hospital services furnished since the previous certification were, and continue to be, required for treatment that could reasonably be expected to improve the patient's condition, or for diagnostic study, and that the patient continues to need, on a daily basis, active treatment furnished directly by or requiring the supervision of inpatient psychiatric facility personnel. In addition, the hospital records show that services furnished were intensive treatment services, admission or related services, or equivalent services.     EXPECTED DISCHARGE DATE/DAY: TBD     DISPOSITION: Home       Signed By:   Ramón Ingram MD  11/11/2021

## 2021-11-11 NOTE — BH NOTES
GROUP THERAPY PROGRESS NOTE    Patient did not participate in Process Group.      Nicole Sánchez LPC [facility]

## 2021-11-11 NOTE — BH NOTES
GROUP THERAPY PROGRESS NOTE    Patient did not participate in Coping Skills Group.      Kayla Boyce LPC [facility]

## 2021-11-11 NOTE — BH NOTES
Behavioral Health Treatment Team Note     Patient goal(s) for today: continue taking medication as prescribed, engage in unit activities, participate in hygiene/ADLs, follow directions from staff, contact support team, prepare for discharge  Treatment team focus/goals: medication adjustments, dispo planning, update support system  Progress note: Pt was seen in treatment team this morning. Pt is alert and oriented. Pt denies SI/HI. Pt's mood is irritable, affect is flat. Pt's thought process is illogical with paranoid delusions. Pt's insight and judgment is poor, reliability is poor. Social work department will continue to coordinate discharge plans. LOS:  2  Expected LOS: TBD    Financial concerns/prescription coverage:  Medicaid   Date of last family contact:  Pt is refusing to sign DINO for family. Family requesting physician contact today:  No  Discharge plan: TBD  Guns in the home: None reported. Outpatient provider(s): To be linked.      Participating treatment team members: TAMY Jain and Dr. Zackery Hardin

## 2021-11-11 NOTE — PROGRESS NOTES
Ronda presented alert,flat affect, labile mood. He was noted pacing in the hallway. He refused to have his VS measured, and refused scheduled HS meds. He was irritable, hostile, and verbally aggressive with staff. He required redirection several times for openly stroking his penis while at the nurses station and hallway. He refused blood draw for labs. He appeared to have slept approx. 4 hours during the night. No distress observed. Monitoring for safety continues.

## 2021-11-11 NOTE — BH NOTES
Pt is alert to person only, he states that he has no idea why he was brought into the hospital. Pt also wanted to argue about the expiration of hs TDO. TDO timing and procedure was explained. Pt did not want to accept the information. Pt is aggressive and hostile. He requires redirection for yelling when approached. Pt denies SI/HI, AVH, depression and anxiety. Pt also refusing calls from his mother stating that she is dead and he did not know the person on there phone. Monitoring will continue for condition changes.

## 2021-11-11 NOTE — H&P
INITIAL PSYCHIATRIC EVALUATION            IDENTIFICATION:    Patient Name  Gen Porter   Date of Birth 1990   [facility] [id number]   Medical Record Number  [id number]      Age  27 y.o. PCP None   Admit date:  11/9/2021    Room Number  737/98  @ [facility]   Date of Service  11/10/2021            HISTORY         REASON FOR HOSPITALIZATION:  CC: \"psychosis / Wynonia Pillion". Pt admitted under a temporary intermediate order (TDO) for severe psychosis proving to be an imminent danger to self and an inability to care for self. HISTORY OF PRESENT ILLNESS:    The patient, Gen Porter, is a 27 y.o. BLACK/ male with a past psychiatric history significant for schizoaffective vs bipolar disorder, who presents at this time with complaints of (and/or evidence of) the following emotional symptoms: psychotic behavior and eliseo. Additional symptomatology include poor self care. The above symptoms have been present for 2+ weeks. These symptoms are of moderate to high severity. These symptoms are constant in nature. The patient's condition has been precipitated by psychosocial stressors. Patient's condition made worse by treatment noncompliance. UDS: negative; BAL=0. Per admission documentation, the patient was picked up by police following suspicious car report; he had run out of gas en route to [place] and was unable to fully explain his situation. Patient has been guarded and uncooperative on the unit, pacing the hallway and responding to internal stimuli. Patient grossly incoherent on interview, dismissive and suspicious. The patient is a poor and uncooperative historian. The patient corroborates the above narrative. The patient contracts for safety on the unit and gives consent for the team to contact collateral. The patient is not amenable to initiating treatment while on the unit.  Patient states he ran out of gas and offers little information regarding how he came to be in [place] or why he was traveling to [place] from his native [place] -- he states he lives in [place] though this is not corroborated. Patient ends interview abruptly and continues to pace the unit. Per collateral, patient has not been compliant with medications in the past and has had prior episodes of psychosis. ALLERGIES: No Known Allergies   MEDICATIONS PRIOR TO ADMISSION:   No medications prior to admission. PAST MEDICAL HISTORY:   History reviewed. No pertinent past medical history. History reviewed. No pertinent surgical history. SOCIAL HISTORY:  The patient is currently employed; the patient is not a smoker; the patient's marital status is single; the patient does not have children ; the patient reports the highest level of education achieved is college. FAMILY HISTORY: History reviewed, pertinent family history as below:   History reviewed. No pertinent family history. REVIEW OF SYSTEMS:   Pertinent items are noted in the History of Present Illness. All other Systems reviewed and are considered negative. MENTAL STATUS EXAM & VITALS     MENTAL STATUS EXAM (MSE):    MSE FINDINGS ARE WITHIN NORMAL LIMITS (WNL) UNLESS OTHERWISE STATED BELOW. ( ALL OF THE BELOW CATEGORIES OF THE MSE HAVE BEEN REVIEWED (reviewed 11/10/2021) AND UPDATED AS DEEMED APPROPRIATE )  General Presentation age appropriate, guarded and uncooperative   Orientation disorganized, not oriented to situation   Vital Signs  See below (reviewed 11/10/2021); Vital Signs (BP, Pulse, & Temp) are within normal limits if not listed below.    Gait and Station Stable/steady, no ataxia   Musculoskeletal System No extrapyramidal symptoms (EPS); no abnormal muscular movements or Tardive Dyskinesia (TD); muscle strength and tone are within normal limits   Language No aphasia or dysarthria   Speech:  normal volume and pressured   Thought Processes illogical; normal rate of thoughts; poor abstract reasoning/computation   Thought Associations blocked Thought Content preoccupations and internally preoccupied   Suicidal Ideations none   Homicidal Ideations none   Mood:  irritable   Affect:  constricted and increased in intensity   Memory recent  fair   Memory remote:  intact   Concentration/Attention:  intact   Fund of Knowledge average   Insight:  poor   Reliability poor   Judgment:  poor          VITALS:     Patient Vitals for the past 24 hrs:   Temp Pulse Resp BP SpO2   11/10/21 0128 97.7 °F (36.5 °C) 81 17 129/75 99 %     Wt Readings from Last 3 Encounters:   11/10/21 90.7 kg (200 lb)     Temp Readings from Last 3 Encounters:   11/10/21 97.7 °F (36.5 °C)     BP Readings from Last 3 Encounters:   11/10/21 129/75     Pulse Readings from Last 3 Encounters:   11/10/21 81            DATA     LABORATORY DATA:  Labs Reviewed   URINALYSIS W/ REFLEX CULTURE - Abnormal; Notable for the following components:       Result Value    Specific gravity >1.030 (*)     Ketone TRACE (*)     Bacteria 1+ (*)     Mucus 2+ (*)     All other components within normal limits   CBC WITH AUTOMATED DIFF - Abnormal; Notable for the following components:    ABS.  LYMPHOCYTES 3.7 (*)     All other components within normal limits   METABOLIC PANEL, COMPREHENSIVE - Abnormal; Notable for the following components:    Potassium 3.4 (*)     All other components within normal limits   COVID-19 WITH INFLUENZA A/B   DRUG SCREEN, URINE   ETHYL ALCOHOL     Admission on 11/09/2021   Component Date Value Ref Range Status    SARS-CoV-2 11/09/2021 Not detected  NOTD   Final    Influenza A by PCR 11/09/2021 Not detected    Final    Influenza B by PCR 11/09/2021 Not detected    Final    AMPHETAMINES 11/09/2021 Negative  NEG   Final    BARBITURATES 11/09/2021 Negative  NEG   Final    BENZODIAZEPINES 11/09/2021 Negative  NEG   Final    COCAINE 11/09/2021 Negative  NEG   Final    METHADONE 11/09/2021 Negative  NEG   Final    OPIATES 11/09/2021 Negative  NEG   Final    PCP(PHENCYCLIDINE) 11/09/2021 Negative  NEG   Final    THC (TH-CANNABINOL) 11/09/2021 Negative  NEG   Final    Drug screen comment 11/09/2021 (NOTE)   Final    Color 11/09/2021 DARK YELLOW    Final    Appearance 11/09/2021 CLEAR  CLEAR   Final    Specific gravity 11/09/2021 >1.030* 1.003 - 1.030 Final    pH (UA) 11/09/2021 5.5  5.0 - 8.0   Final    Protein 11/09/2021 Negative  NEG mg/dL Final    Glucose 11/09/2021 Negative  NEG mg/dL Final    Ketone 11/09/2021 TRACE* NEG mg/dL Final    Bilirubin 11/09/2021 Negative  NEG   Final    Blood 11/09/2021 Negative  NEG   Final    Urobilinogen 11/09/2021 1.0  0.2 - 1.0 EU/dL Final    Nitrites 11/09/2021 Negative  NEG   Final    Leukocyte Esterase 11/09/2021 Negative  NEG   Final    WBC 11/09/2021 0-4  0 - 4 /hpf Final    RBC 11/09/2021 0-5  0 - 5 /hpf Final    Epithelial cells 11/09/2021 FEW  FEW /lpf Final    Bacteria 11/09/2021 1+* NEG /hpf Final    UA:UC IF INDICATED 11/09/2021 CULTURE NOT INDICATED BY UA RESULT  CNI   Final    Mucus 11/09/2021 2+* NEG /lpf Final    WBC 11/09/2021 7.5  4.1 - 11.1 K/uL Final    RBC 11/09/2021 4.93  4.10 - 5.70 M/uL Final    HGB 11/09/2021 13.3  12.1 - 17.0 g/dL Final    HCT 11/09/2021 41.1  36.6 - 50.3 % Final    MCV 11/09/2021 83.4  80.0 - 99.0 FL Final    MCH 11/09/2021 27.0  26.0 - 34.0 PG Final    MCHC 11/09/2021 32.4  30.0 - 36.5 g/dL Final    RDW 11/09/2021 13.4  11.5 - 14.5 % Final    PLATELET 21/84/5018 768  150 - 400 K/uL Final    MPV 11/09/2021 10.6  8.9 - 12.9 FL Final    NRBC 11/09/2021 0.0  0  WBC Final    ABSOLUTE NRBC 11/09/2021 0.00  0.00 - 0.01 K/uL Final    NEUTROPHILS 11/09/2021 41  32 - 75 % Final    LYMPHOCYTES 11/09/2021 49  12 - 49 % Final    MONOCYTES 11/09/2021 7  5 - 13 % Final    EOSINOPHILS 11/09/2021 2  0 - 7 % Final    BASOPHILS 11/09/2021 1  0 - 1 % Final    IMMATURE GRANULOCYTES 11/09/2021 0  0.0 - 0.5 % Final    ABS. NEUTROPHILS 11/09/2021 3.1  1.8 - 8.0 K/UL Final    ABS.  LYMPHOCYTES 11/09/2021 3.7* 0.8 - 3.5 K/UL Final    ABS. MONOCYTES 11/09/2021 0.5  0.0 - 1.0 K/UL Final    ABS. EOSINOPHILS 11/09/2021 0.1  0.0 - 0.4 K/UL Final    ABS. BASOPHILS 11/09/2021 0.0  0.0 - 0.1 K/UL Final    ABS. IMM. GRANS. 11/09/2021 0.0  0.00 - 0.04 K/UL Final    DF 11/09/2021 AUTOMATED    Final    Sodium 11/09/2021 143  136 - 145 mmol/L Final    Potassium 11/09/2021 3.4* 3.5 - 5.1 mmol/L Final    Chloride 11/09/2021 106  97 - 108 mmol/L Final    CO2 11/09/2021 28  21 - 32 mmol/L Final    Anion gap 11/09/2021 9  5 - 15 mmol/L Final    Glucose 11/09/2021 94  65 - 100 mg/dL Final    BUN 11/09/2021 14  6 - 20 MG/DL Final    Creatinine 11/09/2021 0.93  0.70 - 1.30 MG/DL Final    BUN/Creatinine ratio 11/09/2021 15  12 - 20   Final    GFR est AA 11/09/2021 >60  >60 ml/min/1.73m2 Final    GFR est non-AA 11/09/2021 >60  >60 ml/min/1.73m2 Final    Calcium 11/09/2021 8.6  8.5 - 10.1 MG/DL Final    Bilirubin, total 11/09/2021 0.6  0.2 - 1.0 MG/DL Final    ALT (SGPT) 11/09/2021 24  12 - 78 U/L Final    AST (SGOT) 11/09/2021 23  15 - 37 U/L Final    Alk. phosphatase 11/09/2021 84  45 - 117 U/L Final    Protein, total 11/09/2021 7.2  6.4 - 8.2 g/dL Final    Albumin 11/09/2021 4.0  3.5 - 5.0 g/dL Final    Globulin 11/09/2021 3.2  2.0 - 4.0 g/dL Final    A-G Ratio 11/09/2021 1.3  1.1 - 2.2   Final    ALCOHOL(ETHYL),SERUM 11/09/2021 <10  <10 MG/DL Final        RADIOLOGY REPORTS:  No results found for this or any previous visit. No results found.            MEDICATIONS       ALL MEDICATIONS  Current Facility-Administered Medications   Medication Dose Route Frequency    OLANZapine (ZyPREXA) tablet 5 mg  5 mg Oral Q6H PRN    haloperidol lactate (HALDOL) injection 5 mg  5 mg IntraMUSCular Q6H PRN    benztropine (COGENTIN) tablet 1 mg  1 mg Oral BID PRN    diphenhydrAMINE (BENADRYL) injection 50 mg  50 mg IntraMUSCular BID PRN    hydrOXYzine HCL (ATARAX) tablet 50 mg  50 mg Oral TID PRN    LORazepam (ATIVAN) injection 1 mg  1 mg IntraMUSCular Q4H PRN    traZODone (DESYREL) tablet 50 mg  50 mg Oral QHS PRN    acetaminophen (TYLENOL) tablet 650 mg  650 mg Oral Q4H PRN    magnesium hydroxide (MILK OF MAGNESIA) 400 mg/5 mL oral suspension 30 mL  30 mL Oral DAILY PRN    ARIPiprazole (ABILIFY) 1 mg/mL oral soln 10 mg  10 mg Oral DAILY    valproic acid (as sodium salt) (DEPAKENE) 250 mg/5 mL (5 mL) oral solution 750 mg  750 mg Oral Q12H      SCHEDULED MEDICATIONS  Current Facility-Administered Medications   Medication Dose Route Frequency    ARIPiprazole (ABILIFY) 1 mg/mL oral soln 10 mg  10 mg Oral DAILY    valproic acid (as sodium salt) (DEPAKENE) 250 mg/5 mL (5 mL) oral solution 750 mg  750 mg Oral Q12H                ASSESSMENT & PLAN        The patient, Tati Bess, is a 27 y.o.  male who presents at this time for treatment of the following diagnoses:  Patient Active Hospital Problem List:   Bipolar disorder most recent episode manic ([facility] [address] (11/9/2021)    Assessment: patient with decompensation in the field secondary to medication non-compliance. He is significantly impaired and will require stabilization with typical eliseo agents. Plan:  - START Abilify oral soln 10 mg QDAY for psychosis  - START Depakote oral soln 750 mg Q12H for eliseo  - Consider court order  - IGM therapy as tolerated  - Expand database / obtain collateral  - Dispo planning (home when stable    I will continue to monitor blood levels (valproic acid---a drug with a narrow therapeutic index= NTI) and associated labs for drug therapy implemented that require intense monitoring for toxicity as deemed appropriate based on current medication side effects and pharmacodynamically determined drug 1/2 lives. A coordinated, multidisplinary treatment team (includes the nurse, unit pharmacist,  and writer) round was conducted for this initial evaluation with the patient present.      The following regarding medications was addressed during rounds with patient: the risks and benefits of the proposed medication. The patient was given the opportunity to ask questions. Informed consent given to the use of the above medications. I will continue to adjust psychiatric and non-psychiatric medications (see above \"medication\" section and orders section for details) as deemed appropriate & based upon diagnoses and response to treatment. I have reviewed admission (and previous/old) labs and medical tests in the EHR and or transferring hospital documents. I will continue to order blood tests/labs and diagnostic tests as deemed appropriate and review results as they become available (see orders for details). I have reviewed old psychiatric and medical records available in the EHR. I Will order additional psychiatric records from other institutions to further elucidate the nature of patient's psychopathology and review once available. I will gather additional collateral information from friends, family and o/p treatment team to further elucidate the nature of patient's psychopathology and baselline level of psychiatric functioning. I certify that this patient's inpatient psychiatric hospital services are required for treatment that could reasonably be expected to improve the patient's condition, or for diagnostic study, and that the patient continues to need, on a daily basis, active treatment furnished directly by or requiring the supervision of inpatient psychiatric facility personnel. In addition, the hospital records show that services furnished were intensive treatment services, admission or related services, or equivalent services.       ESTIMATED LENGTH OF STAY:  5-7 days       STRENGTHS:  Exercising self-direction/Resourceful, Access to housing/residential stability and Interpersonal/supportive relationships (family, friends, peers)                                        SIGNED:    Sally Schaefer MD  11/10/2021

## 2021-11-12 PROCEDURE — 65220000003 HC RM SEMIPRIVATE PSYCH

## 2021-11-12 PROCEDURE — 99232 SBSQ HOSP IP/OBS MODERATE 35: CPT | Performed by: PSYCHIATRY & NEUROLOGY

## 2021-11-12 PROCEDURE — 74011250637 HC RX REV CODE- 250/637: Performed by: PSYCHIATRY & NEUROLOGY

## 2021-11-12 RX ORDER — ARIPIPRAZOLE 1 MG/ML
10 SOLUTION ORAL DAILY
Status: DISCONTINUED | OUTPATIENT
Start: 2021-11-12 | End: 2021-11-15

## 2021-11-12 RX ORDER — VALPROIC ACID 250 MG/5ML
750 SOLUTION ORAL EVERY 12 HOURS
Status: DISCONTINUED | OUTPATIENT
Start: 2021-11-12 | End: 2021-11-27 | Stop reason: HOSPADM

## 2021-11-12 RX ORDER — HALOPERIDOL 5 MG/ML
5 INJECTION INTRAMUSCULAR DAILY
Status: DISCONTINUED | OUTPATIENT
Start: 2021-11-12 | End: 2021-11-15

## 2021-11-12 RX ORDER — LORAZEPAM 2 MG/ML
1 INJECTION INTRAMUSCULAR EVERY 12 HOURS
Status: DISCONTINUED | OUTPATIENT
Start: 2021-11-12 | End: 2021-11-27 | Stop reason: HOSPADM

## 2021-11-12 RX ADMIN — ARIPIPRAZOLE 10 MG: 1 SOLUTION ORAL at 17:37

## 2021-11-12 RX ADMIN — VALPROIC ACID 750 MG: 250 SOLUTION ORAL at 17:37

## 2021-11-12 NOTE — GROUP NOTE
SERA  GROUP DOCUMENTATION INDIVIDUAL                                                                          Group Therapy Note    Date: 11/12/2021    Group Start Time: 1500  Group End Time: 1600  Group Topic: Recreational/Music Therapy    [facility] [place] [address] ACUTE BEHAV [facility]    [place], [address] [facility] GROUP DOCUMENTATION GROUP    Group Therapy Note    Attendees: 7         Attendance: Attended    Patient's Goal:  To concentrate on selected task    Interventions/techniques: Supported-crafts,games,music    Interactions: Minimal    Mental Status: Flat and Preoccupied    Behavior/appearance: Needed prompting and Withdrawn/quiet    Goals Achieved:        Additional Notes:  Pt declined active participation-left session early    [place] [employer]

## 2021-11-12 NOTE — PROGRESS NOTES
Patient had no behavioral issues. Patient denied SI/HI/AVH, no DTS/DTO behaviors. Patient refused to have his vital signs taken, patient also refused to take his HS medications. Patient remains labile and irritable. No signs of acute distress noted. Will continue to monitor.

## 2021-11-12 NOTE — BH NOTES
Behavioral Health Treatment Team Note      Patient goal(s) for today: continue taking medication as prescribed, engage in unit activities, participate in hygiene/ADLs, follow directions from staff, contact support team, prepare for discharge  Treatment team focus/goals: medication adjustments, dispo planning, update support system  Progress note: Pt was seen in treatment team this morning. Pt is alert and oriented. Pt denies SI/HI. Pt's mood is irritable, affect is flat. Pt's thought process is illogical with paranoid delusions. Pt's insight and judgment is poor, reliability is poor. Pt refusing to sign release to provide family with updates. Krystal Hogan from Legal department contacted after mother sent vehicle title information and  Felecia Kevin reportedly said the keys to mother's car can be returned to her by security. Social work department will continue to coordinate discharge plans.      LOS:  3                        Expected LOS: TBD     Financial concerns/prescription coverage:  Medicaid   Date of last family contact:  Pt is refusing to sign DINO for family. Family requesting physician contact today:  No  Discharge plan: TBD  Guns in the home: None reported. Outpatient provider(s):  To be linked.      Participating treatment team members: TAMY Briggs and Dr. Juarez Cutler

## 2021-11-12 NOTE — GROUP NOTE
SERA  GROUP DOCUMENTATION INDIVIDUAL                                                                          Group Therapy Note    Date: 11/12/2021    Group Start Time: 0900  Group End Time: 1000  Group Topic: Topic Group    [address] ACUTE BEHAV [facility]    [place], [address] [address] GROUP    Group Therapy Note    Attendees: 5         Attendance: Did not attend    Patient's Goal:      Interventions/techniquesBeyudelkaly MICHEAL Brush

## 2021-11-12 NOTE — GROUP NOTE
[facility] GROUP DOCUMENTATION INDIVIDUAL                                                                          Group Therapy Note    Date: 11/12/2021    Group Start Time: 1100  Group End Time: 1200  Group Topic: Topic Group    [address] ACUTE BEHAV [facility]    [place]dy Puentes [facility] GROUP    Group Therapy Note    Attendees: 5         Attendance: Did not attend    Patient's Goal:      Interventions/techniques  Yesi Fernandez

## 2021-11-12 NOTE — BH NOTES
PSYCHIATRIC PROGRESS NOTE         Patient Name  Connie Miller   Date of Birth 1990   [facility] [id number]   Medical Record Number  [id number]      Age  27 y.o. PCP None   Admit date:  11/9/2021    Room Number  210/94  @ [facility]   Date of Service  11/12/2021         E & M PROGRESS NOTE:         HISTORY       CC:  \"psychosis\"  HISTORY OF PRESENT ILLNESS/INTERVAL HISTORY:  (reviewed/updated 11/12/2021). per initial evaluation: The patient, Connie Miller, is a 27 y.o. BLACK/ male with a past psychiatric history significant for schizoaffective vs bipolar disorder, who presents at this time with complaints of (and/or evidence of) the following emotional symptoms: psychotic behavior and eliseo. Additional symptomatology include poor self care. The above symptoms have been present for 2+ weeks. These symptoms are of moderate to high severity. These symptoms are constant in nature. The patient's condition has been precipitated by psychosocial stressors. Patient's condition made worse by treatment noncompliance. UDS: negative; BAL=0.     Per admission documentation, the patient was picked up by police following suspicious car report; he had run out of gas en route to [place] and was unable to fully explain his situation. Patient has been guarded and uncooperative on the unit, pacing the hallway and responding to internal stimuli. Patient grossly incoherent on interview, dismissive and suspicious.       The patient is a poor and uncooperative historian. The patient corroborates the above narrative. The patient contracts for safety on the unit and gives consent for the team to contact collateral. The patient is not amenable to initiating treatment while on the unit. Patient states he ran out of gas and offers little information regarding how he came to be in [place] or why he was traveling to [place] from his native [place] -- he states he lives in [place] though this is not corroborated.  Patient ends interview abruptly and continues to pace the unit. Per collateral, patient has not been compliant with medications in the past and has had prior episodes of psychosis. 11/11 - the patient slept 4 hours overnight. He remains labile and disorganized, with inappropriate actions such as masturbating at he nurses' station. Patient refused medications and labwork, still pacing the unit with an odd affect. No PRNs given as patient is verbally redirectable, no physical aggression. Court order pending for 11/12 for forced medication during TDO hearing. 11/12 - patient remains uncooperative with treatment; he is bizarre and illogical on interview, still stating his family is dead. He slept 6.5 hours, refused all assessments, vitals and medications. Patient irritable, wants to leave the hospital today. Per family, he has the keys to his mother's car and will not return them (per SW, the keys cannot be taken from the hospital unless he releases his personal property to them). SIDE EFFECTS: (reviewed/updated 11/12/2021)  None reported or admitted to. ALLERGIES:(reviewed/updated 11/12/2021)  No Known Allergies   MEDICATIONS PRIOR TO ADMISSION:(reviewed/updated 11/12/2021)  No medications prior to admission. PAST MEDICAL HISTORY: Past medical history from the initial psychiatric evaluation has been reviewed (reviewed/updated 11/12/2021) with no additional updates (I asked patient and no additional past medical history provided). History reviewed. No pertinent past medical history. History reviewed. No pertinent surgical history. SOCIAL HISTORY: Social history from the initial psychiatric evaluation has been reviewed (reviewed/updated 11/12/2021) with no additional updates (I asked patient and no additional social history provided).    Social History     Socioeconomic History    Marital status: SINGLE     Spouse name: Not on file    Number of children: Not on file    Years of education: Not on file  Highest education level: Not on file   Occupational History    Not on file   Tobacco Use    Smoking status: Current Some Day Smoker    Smokeless tobacco: Not on file   Substance and Sexual Activity    Alcohol use: Not Currently    Drug use: Not Currently    Sexual activity: Not Currently   Other Topics Concern    Not on file   Social History Narrative    Not on file     Social Determinants of Health     Financial Resource Strain:     Difficulty of Paying Living Expenses: Not on file   Food Insecurity:     Worried About Running Out of Food in the Last Year: Not on file    Ana of Food in the Last Year: Not on file   Transportation Needs:     Lack of Transportation (Medical): Not on file    Lack of Transportation (Non-Medical): Not on file   Physical Activity:     Days of Exercise per Week: Not on file    Minutes of Exercise per Session: Not on file   Stress:     Feeling of Stress : Not on file   Social Connections:     Frequency of Communication with Friends and Family: Not on file    Frequency of Social Gatherings with Friends and Family: Not on file    Attends Spiritism Services: Not on file    Active Member of [address] [place] [address] or Organizations: Not on file    Attends Club or Organization Meetings: Not on file    Marital Status: Not on file   Intimate Partner Violence:     Fear of Current or Ex-Partner: Not on file    Emotionally Abused: Not on file    Physically Abused: Not on file    Sexually Abused: Not on file   Housing Stability:     Unable to Pay for Housing in the Last Year: Not on file    Number of [place] in the Last Year: Not on file    Unstable Housing in the Last Year: Not on file      FAMILY HISTORY: Family history from the initial psychiatric evaluation has been reviewed (reviewed/updated 11/12/2021) with no additional updates (I asked patient and no additional family history provided). History reviewed. No pertinent family history.     REVIEW OF SYSTEMS: (reviewed/updated 11/12/2021)  Appetite:no change from normal   Sleep: poor with DIMS (difficulty initiating & maintaining sleep)   All other Review of Systems: Negative except per HPI         [address] Mid[address] (MSE):    MSE FINDINGS ARE WITHIN NORMAL LIMITS (WNL) UNLESS OTHERWISE STATED BELOW. ( ALL OF THE BELOW CATEGORIES OF THE MSE HAVE BEEN REVIEWED (reviewed 11/12/2021) AND UPDATED AS DEEMED APPROPRIATE )  General Presentation age appropriate, guarded and uncooperative   Orientation disorganized, oriented to time, place and person   Vital Signs  See below (reviewed 11/12/2021); Vital Signs (BP, Pulse, & Temp) are within normal limits if not listed below.    Gait and Station Stable/steady, no ataxia   Musculoskeletal System No extrapyramidal symptoms (EPS); no abnormal muscular movements or Tardive Dyskinesia (TD); muscle strength and tone are within normal limits   Language No aphasia or dysarthria   Speech:  hypoverbal and increased latency of response   Thought Processes illogical; normal rate of thoughts; poor abstract reasoning/computation   Thought Associations loose associations   Thought Content preoccupations and internally preoccupied   Suicidal Ideations none   Homicidal Ideations none   Mood:  hostile  and irritable   Affect:  constricted and increased in intensity   Memory recent  fair   Memory remote:  intact   Concentration/Attention:  intact   Fund of Knowledge average   Insight:  poor   Reliability fair   Judgment:  poor          VITALS:     Patient Vitals for the past 24 hrs:   Resp   11/11/21 2142 16     Wt Readings from Last 3 Encounters:   11/10/21 90.7 kg (200 lb)     Temp Readings from Last 3 Encounters:   No data found for Temp     BP Readings from Last 3 Encounters:   No data found for BP     Pulse Readings from Last 3 Encounters:   No data found for Pulse            DATA     LABORATORY DATA:(reviewed/updated 11/12/2021)  No results found for this or any previous visit (from the past 24 hour(s)). No results found for: VALF2, VALAC, VALP, VALPR, DS6, CRBAM, CRBAMP, CARB2, XCRBAM  No results found for: LITHM   RADIOLOGY REPORTS:(reviewed/updated 11/12/2021)  No results found. MEDICATIONS     ALL MEDICATIONS:   Current Facility-Administered Medications   Medication Dose Route Frequency    OLANZapine (ZyPREXA) tablet 5 mg  5 mg Oral Q6H PRN    haloperidol lactate (HALDOL) injection 5 mg  5 mg IntraMUSCular Q6H PRN    benztropine (COGENTIN) tablet 1 mg  1 mg Oral BID PRN    diphenhydrAMINE (BENADRYL) injection 50 mg  50 mg IntraMUSCular BID PRN    hydrOXYzine HCL (ATARAX) tablet 50 mg  50 mg Oral TID PRN    LORazepam (ATIVAN) injection 1 mg  1 mg IntraMUSCular Q4H PRN    traZODone (DESYREL) tablet 50 mg  50 mg Oral QHS PRN    acetaminophen (TYLENOL) tablet 650 mg  650 mg Oral Q4H PRN    magnesium hydroxide (MILK OF MAGNESIA) 400 mg/5 mL oral suspension 30 mL  30 mL Oral DAILY PRN    ARIPiprazole (ABILIFY) 1 mg/mL oral soln 10 mg  10 mg Oral DAILY    valproic acid (as sodium salt) (DEPAKENE) 250 mg/5 mL (5 mL) oral solution 750 mg  750 mg Oral Q12H      SCHEDULED MEDICATIONS:   Current Facility-Administered Medications   Medication Dose Route Frequency    ARIPiprazole (ABILIFY) 1 mg/mL oral soln 10 mg  10 mg Oral DAILY    valproic acid (as sodium salt) (DEPAKENE) 250 mg/5 mL (5 mL) oral solution 750 mg  750 mg Oral Q12H          ASSESSMENT & PLAN     DIAGNOSES REQUIRING ACTIVE TREATMENT AND MONITORING: (reviewed/updated 11/12/2021)  Patient Active Hospital Problem List:   Assessment: patient with decompensation in the field secondary to medication non-compliance. He is significantly impaired and will require stabilization with typical eliseo agents.     Plan:  COURT ORDERED MEDICATIONS:   Abilify oral soln 10 mg PO *OR* Haldol 5 mg IM QDAY for psychosis   Depakote oral soln 750 mg *OR* Ativan 1 mg Q12H for eliseo    - IGM therapy as tolerated  - Expand database / obtain collateral  - Dispo planning (home when stable)     I will continue to monitor blood levels (valproic acid---a drug with a narrow therapeutic index= NTI) and associated labs for drug therapy implemented that require intense monitoring for toxicity as deemed appropriate based on current medication side effects and pharmacodynamically determined drug 1/2 lives. In summary, Gen Porter, is a 27 y.o.  male who presents with a severe exacerbation of the principal diagnosis of Bipolar disorder, current episode manic severe with psychotic features ([facility] [address]    Patient's condition is not improving. Patient requires continued inpatient hospitalization for further stabilization, safety monitoring and medication management. I will continue to coordinate the provision of individual, milieu, occupational, group, and substance abuse therapies to address target symptoms/diagnoses as deemed appropriate for the individual patient. A coordinated, multidisplinary treatment team round was conducted with the patient (this team consists of the nurse, psychiatric unit pharmacist,  and writer). Complete current electronic health record for patient has been reviewed today including consultant notes, ancillary staff notes, nurses and psychiatric tech notes. Suicide risk assessment completed and patient deemed to be of low risk for suicide at this time. The following regarding medications was addressed during rounds with patient:   the risks and benefits of the proposed medication. The patient was given the opportunity to ask questions. Informed consent given to the use of the above medications. Will continue to adjust psychiatric and non-psychiatric medications (see above \"medication\" section and orders section for details) as deemed appropriate & based upon diagnoses and response to treatment.      I will continue to order blood tests/labs and diagnostic tests as deemed appropriate and review results as they become available (see orders for details and above listed lab/test results). I will order psychiatric records from previous [facility] hospitals to further elucidate the nature of patient's psychopathology and review once available. I will gather additional collateral information from friends, family and o/p treatment team to further elucidate the nature of patient's psychopathology and baselline level of psychiatric functioning. I certify that this patient's inpatient psychiatric hospital services furnished since the previous certification were, and continue to be, required for treatment that could reasonably be expected to improve the patient's condition, or for diagnostic study, and that the patient continues to need, on a daily basis, active treatment furnished directly by or requiring the supervision of inpatient psychiatric facility personnel. In addition, the hospital records show that services furnished were intensive treatment services, admission or related services, or equivalent services.     EXPECTED DISCHARGE DATE/DAY: TBD     DISPOSITION: Home       Signed By:   Ammy Willett MD  11/12/2021

## 2021-11-12 NOTE — PROGRESS NOTES
Problem: Discharge Planning  Goal: *Discharge to safe environment  Outcome: Progressing Towards Goal  Note: Patient identifies home as a safe environment. Patient will return home upon discharge. Goal: *Knowledge of medication management  Outcome: Not Progressing Towards Goal  Note: Not accepting any medication. [address] now in place. Goal: *Knowledge of discharge instructions  Outcome: Progressing Towards Goal  Note: Patient verbalizes understanding of goals for treatment and safe discharge.

## 2021-11-13 PROCEDURE — 74011250637 HC RX REV CODE- 250/637: Performed by: PSYCHIATRY & NEUROLOGY

## 2021-11-13 PROCEDURE — 65220000003 HC RM SEMIPRIVATE PSYCH

## 2021-11-13 RX ADMIN — ARIPIPRAZOLE 10 MG: 1 SOLUTION ORAL at 09:59

## 2021-11-13 RX ADMIN — VALPROIC ACID 750 MG: 250 SOLUTION ORAL at 10:00

## 2021-11-13 RX ADMIN — VALPROIC ACID 750 MG: 250 SOLUTION ORAL at 21:23

## 2021-11-13 NOTE — PROGRESS NOTES
Problem: Psychosis  Goal: *STG: Decreased hallucinations  11/13/2021 1124 by Edith Park RN  Outcome: Progressing Towards Goal  11/13/2021 1116 by Edith Park RN  Outcome: Progressing Towards Goal  Goal: *STG: Remains safe in hospital  Outcome: Progressing Towards Goal  Goal: *STG: Seeks staff when feelings of self harm or harm towards others arise  Outcome: Progressing Towards Goal  Goal: *STG: Patient will verbalize areas in need of boundary recognition and limit setting  Outcome: Progressing Towards Goal  Goal: *STG: Accept constructive criticism without injury or isolation  Outcome: Progressing Towards Goal  Goal: *STG: Patient will verbalize issues that get in their way of progress (i.e.: anger, fear etc.)  Outcome: Progressing Towards Goal  Goal: *STG/LTG: Complies with medication therapy  Outcome: Progressing Towards Goal  Goal: *STG/LTG: Demonstrates improved social functioning by responding appropriately to staff  11/13/2021 1124 by Edith Park RN  Outcome: Progressing Towards Goal  11/13/2021 1116 by Edith Park RN  Outcome: Progressing Towards Goal  Goal: Interventions  Outcome: Progressing Towards Goal     Problem: Anxiety  Goal: *Alleviation of anxiety  11/13/2021 1124 by Edith Park RN  Outcome: Progressing Towards Goal  11/13/2021 1116 by Edith Park RN  Outcome: Progressing Towards Goal  Goal: *Alleviation of anxiety (Palliative Care)  11/13/2021 1124 by Edith Park RN  Outcome: Progressing Towards Goal  11/13/2021 1116 by Edith Park RN  Outcome: Progressing Towards Goal

## 2021-11-13 NOTE — PROGRESS NOTES
Problem: Psychosis  Goal: *STG: Decreased hallucinations  Outcome: Progressing Towards Goal  Goal: *STG: Seeks staff when feelings of self harm or harm towards others arise  Outcome: Progressing Towards Goal  Goal: *STG: Patient will verbalize areas in need of boundary recognition and limit setting  Outcome: Progressing Towards Goal  Goal: *STG: Accept constructive criticism without injury or isolation  Outcome: Progressing Towards Goal  Goal: *STG/LTG: Demonstrates improved social functioning by responding appropriately to staff  Outcome: Progressing Towards Goal  Goal: Interventions  Outcome: Progressing Towards Goal     Problem: Anxiety  Goal: *Alleviation of anxiety  Outcome: Progressing Towards Goal  Goal: *Alleviation of anxiety (Palliative Care)  Outcome: Progressing Towards Goal

## 2021-11-13 NOTE — BH NOTES
Adult Progress Note    Date: 11/13/2021  Account Number:  [de-identified]  Name: Tati Bess  Diagnosis:bipolar disorder type 1  Length of session: 10 minutes    Subjective:     Patient Active Problem List    Diagnosis Date Noted    Bipolar disorder, current episode manic severe with psychotic features ([facility] [address] 11/10/2021     History reviewed. No pertinent surgical history. No Known Allergies   Social History     Tobacco Use    Smoking status: Current Some Day Smoker    Smokeless tobacco: Not on file   Substance Use Topics    Alcohol use: Not Currently      History reviewed. No pertinent family history. Review of Systems  Pertinent items are noted in HPI. Objective:         No data found. Lab/Data Review:  Lab results reviewed. For significant abnormal values and values requiring intervention, see assessment and plan.     Mental Status exam: WNL     Assessment/Plan:   Principal Problem:    Bipolar disorder, current episode manic severe with psychotic features ([facility] (11/10/2021)          Medications:    Current Facility-Administered Medications   Medication Dose Route Frequency    ARIPiprazole (ABILIFY) 1 mg/mL oral soln 10 mg  10 mg Oral DAILY    Or    haloperidol lactate (HALDOL) injection 5 mg +++COURT ORDERED MEDICATION FOR REFUSAL OF ORAL ARIPIPRAZOLE+++  5 mg IntraMUSCular DAILY    valproic acid (as sodium salt) (DEPAKENE) 250 mg/5 mL (5 mL) oral solution 750 mg  750 mg Oral Q12H    Or    LORazepam (ATIVAN) injection 1 mg +++COURT ORDERED MEDICATION FOR REFUSAL OF VALPROIC ACID+++  1 mg IntraMUSCular Q12H    OLANZapine (ZyPREXA) tablet 5 mg  5 mg Oral Q6H PRN    haloperidol lactate (HALDOL) injection 5 mg  5 mg IntraMUSCular Q6H PRN    benztropine (COGENTIN) tablet 1 mg  1 mg Oral BID PRN    diphenhydrAMINE (BENADRYL) injection 50 mg  50 mg IntraMUSCular BID PRN    hydrOXYzine HCL (ATARAX) tablet 50 mg  50 mg Oral TID PRN    LORazepam (ATIVAN) injection 1 mg  1 mg IntraMUSCular Q4H PRN    traZODone (DESYREL) tablet 50 mg  50 mg Oral QHS PRN    acetaminophen (TYLENOL) tablet 650 mg  650 mg Oral Q4H PRN    magnesium hydroxide (MILK OF MAGNESIA) 400 mg/5 mL oral suspension 30 mL  30 mL Oral DAILY PRN       Side Effects:  none    The following information was reviewed and discussed:  Chance to ask questions    Pt has court ordered meds, and may require IM backup of antipsychotic per staff today.

## 2021-11-14 PROCEDURE — 74011250637 HC RX REV CODE- 250/637: Performed by: PSYCHIATRY & NEUROLOGY

## 2021-11-14 PROCEDURE — 65220000003 HC RM SEMIPRIVATE PSYCH

## 2021-11-14 RX ADMIN — VALPROIC ACID 750 MG: 250 SOLUTION ORAL at 21:25

## 2021-11-14 RX ADMIN — ARIPIPRAZOLE 10 MG: 1 SOLUTION ORAL at 08:31

## 2021-11-14 RX ADMIN — VALPROIC ACID 750 MG: 250 SOLUTION ORAL at 08:31

## 2021-11-14 NOTE — BH NOTES
GROUP THERAPY PROGRESS NOTE    Patient did not participate in Coping Skills Group.      TAMY Gilliland

## 2021-11-14 NOTE — PROGRESS NOTES
Patient is irritable and refused vitals. He refused to answer assessment questions. Patient only spoke to staff when he was making a request. He spent much of day pacing around unit and in room. Will continue to monitor.

## 2021-11-14 NOTE — PROGRESS NOTES
Pt screened per LOS policy. No acute nutrition issues identified. Pt meal compliant. Double portions ordered. Hx notable for constipation.   Ht: 6'  Wt: 200 lb  BMI: 27.12 kg/(m^2) /c overweight  Est energy needs: 2355 kcal, 75 g protein, 1 mL/kcal fluids  Pt will consume > 75% of meals at follow up 7-10 days  LOS

## 2021-11-14 NOTE — BH NOTES
During shift, pt remained in bed. Pt refused to answer questions pertaining to SI, HI, A/V hallucinations. Meal/med compliant. Currently, pt is resting in bed with eyes closed. No signs of distress nor made any further complaints. Q15 min safety checks. Locked Unit. Will continue to monitor. Patient slept this shift 8 hours, respirations noted even and unlabored. No Safe environment maintained.

## 2021-11-15 PROCEDURE — 65220000003 HC RM SEMIPRIVATE PSYCH

## 2021-11-15 PROCEDURE — 99232 SBSQ HOSP IP/OBS MODERATE 35: CPT | Performed by: PSYCHIATRY & NEUROLOGY

## 2021-11-15 PROCEDURE — 74011250637 HC RX REV CODE- 250/637: Performed by: PSYCHIATRY & NEUROLOGY

## 2021-11-15 RX ORDER — ARIPIPRAZOLE 1 MG/ML
15 SOLUTION ORAL DAILY
Status: DISCONTINUED | OUTPATIENT
Start: 2021-11-16 | End: 2021-11-18

## 2021-11-15 RX ORDER — HALOPERIDOL 5 MG/ML
5 INJECTION INTRAMUSCULAR DAILY
Status: DISCONTINUED | OUTPATIENT
Start: 2021-11-16 | End: 2021-11-18

## 2021-11-15 RX ADMIN — VALPROIC ACID 750 MG: 250 SOLUTION ORAL at 09:24

## 2021-11-15 RX ADMIN — VALPROIC ACID 750 MG: 250 SOLUTION ORAL at 21:47

## 2021-11-15 RX ADMIN — ARIPIPRAZOLE 10 MG: 1 SOLUTION ORAL at 09:24

## 2021-11-15 NOTE — GROUP NOTE
SERA  GROUP DOCUMENTATION INDIVIDUAL                                                                          Group Therapy Note    Date: 11/15/2021    Group Start Time: 1100  Group End Time: 1200  Group Topic: Topic Group    [facility] [address] ACUTE BEHAV [facility]    [facility] Pu[facility] GROUP    Group Therapy Note    Attendees: 3         Attendance: Did not attend    Patient's Goal:      Interventions/techniques  Vickie Mayer

## 2021-11-15 NOTE — BH NOTES
Behavioral Health Treatment Team Note      Patient goal(s) for today: continue taking medication as prescribed, engage in unit activities, participate in hygiene/ADLs, follow directions from staff, contact support team, prepare for discharge  Treatment team focus/goals: medication adjustments, dispo planning, update support system  Progress note: Pt was seen in treatment team this morning. Pt is alert and oriented. Pt denies SI/HI. Pt's mood is irritable, affect is flat. Pt's thought process is illogical with paranoid delusions - pt continues to reports his mother is dead. Pt's insight and judgment is poor, reliability is poor. Social work department will continue to coordinate discharge plans.      LOS:  6                        Expected LOS: TBD     Financial concerns/prescription coverage:  Medicaid   Date of last family contact:  Pt is refusing to sign DINO for family.                     Family requesting physician contact today:  No  Discharge plan: TBD  Guns in the home: None reported.                                                       Outpatient provider(s): To be linked.      Participating treatment team members: TAMY Moise and Dr. Chiki Dejesus

## 2021-11-15 NOTE — BH NOTES
PSYCHIATRIC PROGRESS NOTE         Patient Name  Hiwot Meza   Date of Birth 1990   [facility] [id number]   Medical Record Number  [id number]      Age  27 y.o. PCP None   Admit date:  11/9/2021    Room Number  966/74  @ [facility]   Date of Service  11/15/2021         E & M PROGRESS NOTE:         HISTORY       CC:  \"psychosis\"  HISTORY OF PRESENT ILLNESS/INTERVAL HISTORY:  (reviewed/updated 11/15/2021). per initial evaluation: The patient, Hiwot Meza, is a 27 y.o. BLACK/ male with a past psychiatric history significant for schizoaffective vs bipolar disorder, who presents at this time with complaints of (and/or evidence of) the following emotional symptoms: psychotic behavior and eliseo. Additional symptomatology include poor self care. The above symptoms have been present for 2+ weeks. These symptoms are of moderate to high severity. These symptoms are constant in nature. The patient's condition has been precipitated by psychosocial stressors. Patient's condition made worse by treatment noncompliance. UDS: negative; BAL=0.     Per admission documentation, the patient was picked up by police following suspicious car report; he had run out of gas en route to [place] and was unable to fully explain his situation. Patient has been guarded and uncooperative on the unit, pacing the hallway and responding to internal stimuli. Patient grossly incoherent on interview, dismissive and suspicious.       The patient is a poor and uncooperative historian. The patient corroborates the above narrative. The patient contracts for safety on the unit and gives consent for the team to contact collateral. The patient is not amenable to initiating treatment while on the unit. Patient states he ran out of gas and offers little information regarding how he came to be in [place] or why he was traveling to [place] from his native [place] -- he states he lives in [place] though this is not corroborated.  Patient ends interview abruptly and continues to pace the unit. Per collateral, patient has not been compliant with medications in the past and has had prior episodes of psychosis. 11/11 - the patient slept 4 hours overnight. He remains labile and disorganized, with inappropriate actions such as masturbating at he nurses' station. Patient refused medications and labwork, still pacing the unit with an odd affect. No PRNs given as patient is verbally redirectable, no physical aggression. Court order pending for 11/12 for forced medication during TDO hearing. 11/12 - patient remains uncooperative with treatment; he is bizarre and illogical on interview, still stating his family is dead. He slept 6.5 hours, refused all assessments, vitals and medications. Patient irritable, wants to leave the hospital today. Per family, he has the keys to his mother's car and will not return them (per SW, the keys cannot be taken from the hospital unless he releases his personal property to them). 11/15 - no significant improvements noted. Patient slept 5 hours overnight, he remains disorganized, guarded, states his family is 'dead' and refuses all assessments. Patient got no PRNs for agitation as verbal redirection has been successful. Patient discharge focused, unable to provide any meaningful information about his admission but has been refusing the team's offer to contact his family for further treatment planning. Patient compliant with court ordered medication. SIDE EFFECTS: (reviewed/updated 11/15/2021)  None reported or admitted to. ALLERGIES:(reviewed/updated 11/15/2021)  No Known Allergies   MEDICATIONS PRIOR TO ADMISSION:(reviewed/updated 11/15/2021)  No medications prior to admission. PAST MEDICAL HISTORY: Past medical history from the initial psychiatric evaluation has been reviewed (reviewed/updated 11/15/2021) with no additional updates (I asked patient and no additional past medical history provided). History reviewed. No pertinent past medical history. History reviewed. No pertinent surgical history. SOCIAL HISTORY: Social history from the initial psychiatric evaluation has been reviewed (reviewed/updated 11/15/2021) with no additional updates (I asked patient and no additional social history provided). Social History     Socioeconomic History    Marital status: SINGLE     Spouse name: Not on file    Number of children: Not on file    Years of education: Not on file    Highest education level: Not on file   Occupational History    Not on file   Tobacco Use    Smoking status: Current Some Day Smoker    Smokeless tobacco: Not on file   Substance and Sexual Activity    Alcohol use: Not Currently    Drug use: Not Currently    Sexual activity: Not Currently   Other Topics Concern    Not on file   Social History Narrative    Not on file     Social Determinants of Health     Financial Resource Strain:     Difficulty of Paying Living Expenses: Not on file   Food Insecurity:     Worried About Running Out of Food in the Last Year: Not on file    Ana of Food in the Last Year: Not on file   Transportation Needs:     Lack of Transportation (Medical): Not on file    Lack of Transportation (Non-Medical):  Not on file   Physical Activity:     Days of Exercise per Week: Not on file    Minutes of Exercise per Session: Not on file   Stress:     Feeling of Stress : Not on file   Social Connections:     Frequency of Communication with Friends and Family: Not on file    Frequency of Social Gatherings with Friends and Family: Not on file    Attends Pent[employer] Services: Not on file    Active Member of Clubs or Organizations: Not on file    Attends Club or Organization Meetings: Not on file    Marital Status: Not on file   Intimate Partner Violence:     Fear of Current or Ex-Partner: Not on file    Emotionally Abused: Not on file    Physically Abused: Not on file    Sexually Abused: Not on file Housing Stability:     Unable to Pay for Housing in the Last Year: Not on file    Number of Places Lived in the Last Year: Not on file    Unstable Housing in the Last Year: Not on file      FAMILY HISTORY: Family history from the initial psychiatric evaluation has been reviewed (reviewed/updated 11/15/2021) with no additional updates (I asked patient and no additional family history provided). History reviewed. No pertinent family history. REVIEW OF SYSTEMS: (reviewed/updated 11/15/2021)  Appetite:no change from normal   Sleep: poor with DIMS (difficulty initiating & maintaining sleep)   All other Review of Systems: Negative except per HPI         [facility] (MSE):    MSE FINDINGS ARE WITHIN NORMAL LIMITS (WNL) UNLESS OTHERWISE STATED BELOW. ( ALL OF THE BELOW CATEGORIES OF THE MSE HAVE BEEN REVIEWED (reviewed 11/15/2021) AND UPDATED AS DEEMED APPROPRIATE )  General Presentation age appropriate, guarded and uncooperative   Orientation disorganized, oriented to time, place and person   Vital Signs  See below (reviewed 11/15/2021); Vital Signs (BP, Pulse, & Temp) are within normal limits if not listed below.    Gait and Station Stable/steady, no ataxia   Musculoskeletal System No extrapyramidal symptoms (EPS); no abnormal muscular movements or Tardive Dyskinesia (TD); muscle strength and tone are within normal limits   Language No aphasia or dysarthria   Speech:  hypoverbal and increased latency of response   Thought Processes illogical; normal rate of thoughts; poor abstract reasoning/computation   Thought Associations loose associations   Thought Content preoccupations and internally preoccupied   Suicidal Ideations none   Homicidal Ideations none   Mood:  hostile  and irritable   Affect:  constricted and increased in intensity   Memory recent  fair   Memory remote:  intact   Concentration/Attention:  intact   Fund of Knowledge average   Insight:  poor   Reliability fair   Judgment:  poor          VITALS:     No data found. Wt Readings from Last 3 Encounters:   11/10/21 90.7 kg (200 lb)     Temp Readings from Last 3 Encounters:   No data found for Temp     BP Readings from Last 3 Encounters:   No data found for BP     Pulse Readings from Last 3 Encounters:   No data found for Pulse            DATA     LABORATORY DATA:(reviewed/updated 11/15/2021)  No results found for this or any previous visit (from the past 24 hour(s)). No results found for: VALF2, VALAC, VALP, VALPR, DS6, CRBAM, CRBAMP, CARB2, XCRBAM  No results found for: LITHM   RADIOLOGY REPORTS:(reviewed/updated 11/15/2021)  No results found.        MEDICATIONS     ALL MEDICATIONS:   Current Facility-Administered Medications   Medication Dose Route Frequency    ARIPiprazole (ABILIFY) 1 mg/mL oral soln 10 mg  10 mg Oral DAILY    Or    haloperidol lactate (HALDOL) injection 5 mg +++COURT ORDERED MEDICATION FOR REFUSAL OF ORAL ARIPIPRAZOLE+++  5 mg IntraMUSCular DAILY    valproic acid (as sodium salt) (DEPAKENE) 250 mg/5 mL (5 mL) oral solution 750 mg  750 mg Oral Q12H    Or    LORazepam (ATIVAN) injection 1 mg +++COURT ORDERED MEDICATION FOR REFUSAL OF VALPROIC ACID+++  1 mg IntraMUSCular Q12H    OLANZapine (ZyPREXA) tablet 5 mg  5 mg Oral Q6H PRN    haloperidol lactate (HALDOL) injection 5 mg  5 mg IntraMUSCular Q6H PRN    benztropine (COGENTIN) tablet 1 mg  1 mg Oral BID PRN    diphenhydrAMINE (BENADRYL) injection 50 mg  50 mg IntraMUSCular BID PRN    hydrOXYzine HCL (ATARAX) tablet 50 mg  50 mg Oral TID PRN    LORazepam (ATIVAN) injection 1 mg  1 mg IntraMUSCular Q4H PRN    traZODone (DESYREL) tablet 50 mg  50 mg Oral QHS PRN    acetaminophen (TYLENOL) tablet 650 mg  650 mg Oral Q4H PRN    magnesium hydroxide (MILK OF MAGNESIA) 400 mg/5 mL oral suspension 30 mL  30 mL Oral DAILY PRN      SCHEDULED MEDICATIONS:   Current Facility-Administered Medications   Medication Dose Route Frequency    ARIPiprazole (ABILIFY) 1 mg/mL oral soln 10 mg  10 mg Oral DAILY    Or    haloperidol lactate (HALDOL) injection 5 mg +++COURT ORDERED MEDICATION FOR REFUSAL OF ORAL ARIPIPRAZOLE+++  5 mg IntraMUSCular DAILY    valproic acid (as sodium salt) (DEPAKENE) 250 mg/5 mL (5 mL) oral solution 750 mg  750 mg Oral Q12H    Or    LORazepam (ATIVAN) injection 1 mg +++COURT ORDERED MEDICATION FOR REFUSAL OF VALPROIC ACID+++  1 mg IntraMUSCular Q12H          ASSESSMENT & PLAN     DIAGNOSES REQUIRING ACTIVE TREATMENT AND MONITORING: (reviewed/updated 11/15/2021)  Patient Active Hospital Problem List:   Assessment: patient with decompensation in the field secondary to medication non-compliance. He is significantly impaired and will require stabilization with typical eliseo agents. Plan:  COURT ORDERED MEDICATIONS:   INCREASE Abilify oral soln to 15 mg PO *OR* Haldol 5 mg IM QDAY for psychosis   Depakote oral soln 750 mg *OR* Ativan 1 mg Q12H for eliseo    - IGM therapy as tolerated  - Expand database / obtain collateral  - Dispo planning (home when stable)     I will continue to monitor blood levels (valproic acid---a drug with a narrow therapeutic index= NTI) and associated labs for drug therapy implemented that require intense monitoring for toxicity as deemed appropriate based on current medication side effects and pharmacodynamically determined drug 1/2 lives. In summary, Juan Renee, is a 27 y.o.  male who presents with a severe exacerbation of the principal diagnosis of Bipolar disorder, current episode manic severe with psychotic features ([facility] [address]    Patient's condition is not improving. Patient requires continued inpatient hospitalization for further stabilization, safety monitoring and medication management. I will continue to coordinate the provision of individual, milieu, occupational, group, and substance abuse therapies to address target symptoms/diagnoses as deemed appropriate for the individual patient.   A coordinated, multidisplinary treatment team round was conducted with the patient (this team consists of the nurse, psychiatric unit pharmacist,  and writer). Complete current electronic health record for patient has been reviewed today including consultant notes, ancillary staff notes, nurses and psychiatric tech notes. Suicide risk assessment completed and patient deemed to be of low risk for suicide at this time. The following regarding medications was addressed during rounds with patient:   the risks and benefits of the proposed medication. The patient was given the opportunity to ask questions. Informed consent given to the use of the above medications. Will continue to adjust psychiatric and non-psychiatric medications (see above \"medication\" section and orders section for details) as deemed appropriate & based upon diagnoses and response to treatment. I will continue to order blood tests/labs and diagnostic tests as deemed appropriate and review results as they become available (see orders for details and above listed lab/test results). I will order psychiatric records from previous [facility] hospitals to further elucidate the nature of patient's psychopathology and review once available. I will gather additional collateral information from friends, family and o/p treatment team to further elucidate the nature of patient's psychopathology and baselline level of psychiatric functioning. I certify that this patient's inpatient psychiatric hospital services furnished since the previous certification were, and continue to be, required for treatment that could reasonably be expected to improve the patient's condition, or for diagnostic study, and that the patient continues to need, on a daily basis, active treatment furnished directly by or requiring the supervision of inpatient psychiatric facility personnel.  In addition, the hospital records show that services furnished were intensive treatment services, admission or related services, or equivalent services.     EXPECTED DISCHARGE DATE/DAY: TBD     DISPOSITION: Home       Signed By:   Keenan Thompson MD  11/15/2021

## 2021-11-15 NOTE — GROUP NOTE
SERA  GROUP DOCUMENTATION INDIVIDUAL                                                                          Group Therapy Note    Date: 11/15/2021    Group Start Time: 1500  Group End Time: 1600  Group Topic: Recreational/Music Therapy    [facility] [place] [address] ACUTE BEHAV [facility]    [address] GROUP    Group Therapy Note    Attendees: 3         Attendance: Did not attend    Patient's Goal:      Interventions/techniquesNyasia Brush

## 2021-11-15 NOTE — PROGRESS NOTES
Problem: Psychosis  Goal: *STG: Decreased hallucinations  Outcome: Progressing Towards Goal  Goal: *STG: Remains safe in hospital  Outcome: Progressing Towards Goal  Goal: *STG: Seeks staff when feelings of self harm or harm towards others arise  Outcome: Progressing Towards Goal  Goal: *STG: Patient will verbalize issues that get in their way of progress (i.e.: anger, fear etc.)  Outcome: Progressing Towards Goal  Goal: *STG/LTG: Demonstrates improved social functioning by responding appropriately to staff  Outcome: Progressing Towards Goal     Problem: Anxiety  Goal: *Alleviation of anxiety  Outcome: Progressing Towards Goal  Goal: *Alleviation of anxiety (Palliative Care)  Outcome: Progressing Towards Goal

## 2021-11-15 NOTE — PROGRESS NOTES
Problem: Psychosis  Goal: *STG: Decreased hallucinations  Outcome: Progressing Towards Goal  Goal: *STG: Decreased delusional thinking  Outcome: Progressing Towards Goal  Goal: *STG: Remains safe in hospital  Outcome: Progressing Towards Goal     [id number] Shift report received from Christian Chen 54 Patient met in the hallway walking, refused to answer assessment questions and refused vital signs. Quick 15 minutes checks is ongoing. No violence or aggression recorded.       [id number] Patient, resting in his room.  No behavioral health issues recorded.   Quick 15 minutes checks ongoing for safety. 0400- 0600 Patient is sleeping. Q15 minutes checks ongoing. No issues. At 0600, he slept a total of 4.25 hours.

## 2021-11-15 NOTE — BH NOTES
0700- Received report from [facility] is alert/oriented x4. Pt remains disorganized, paranoid, bizarre and non compliant with vitals and answering assessment questions. Mood is irritable. Meds/meal compliant. No behavioral issues. Denies suicidal and homicidal ideations. Denies auditory and visual hallucinations. Will continue to monitor pt with q15 min rounds for safety.

## 2021-11-15 NOTE — PROGRESS NOTES
Patient is irritable and pacing on unit. He refused to answer assessment questions. No signs of acute distress noted. Will continue to monitor.

## 2021-11-15 NOTE — BH NOTES
GROUP THERAPY PROGRESS NOTE    Patient did not participate in Discharge Planning Group.      TAMY Grover

## 2021-11-15 NOTE — GROUP NOTE
SERA  GROUP DOCUMENTATION INDIVIDUAL                                                                          Group Therapy Note    Date: 11/15/2021    Group Start Time: 0900  Group End Time: 1000  Group Topic: Topic Group    [facility] [place] [address] ACUTE BEHAV [facility], [address] GROUP    Group Therapy Note    Attendees: 5         Attendance: Did not attend    Patient's Goal:      Interventions/techniques: Cuca Dozier

## 2021-11-16 PROCEDURE — 74011250637 HC RX REV CODE- 250/637: Performed by: PSYCHIATRY & NEUROLOGY

## 2021-11-16 PROCEDURE — 65220000003 HC RM SEMIPRIVATE PSYCH

## 2021-11-16 PROCEDURE — 99231 SBSQ HOSP IP/OBS SF/LOW 25: CPT | Performed by: PSYCHIATRY & NEUROLOGY

## 2021-11-16 RX ADMIN — ARIPIPRAZOLE 15 MG: 1 SOLUTION ORAL at 09:16

## 2021-11-16 RX ADMIN — VALPROIC ACID 750 MG: 250 SOLUTION ORAL at 21:38

## 2021-11-16 RX ADMIN — VALPROIC ACID 750 MG: 250 SOLUTION ORAL at 09:16

## 2021-11-16 NOTE — GROUP NOTE
SERA  GROUP DOCUMENTATION INDIVIDUAL                                                                          Group Therapy Note    Date: 11/16/2021    Group Start Time: 0900  Group End Time: 1000  Group Topic: Topic Group    [facility] [place] [address] ACUTE BEHAV [facility]    [place], [address] GROUP DOCUMENTATION GROUP    Group Therapy Note    Attendees: 6         Attendance: Did not attend    Patient's Goal:      Interventions/techniques  Odette Lemus

## 2021-11-16 NOTE — BH NOTES
GROUP THERAPY PROGRESS NOTE    Patient did not participate in Coping Skills group.      Nicole Sánchez LPC LSATP [facility]AC

## 2021-11-16 NOTE — GROUP NOTE
SERA  GROUP DOCUMENTATION INDIVIDUAL                                                                          Group Therapy Note    Date: 11/16/2021    Group Start Time: 1100  Group End Time: 1200  Group Topic: Topic Group    [facility] [place] [address] ACUTE BEHAV [facility]    [address] GROUP    Group Therapy Note    Attendees: 6         Attendance: Did not attend    Patient's Goal:      Interventions/techniques  Delmi Hu

## 2021-11-16 NOTE — BH NOTES
PSYCHIATRIC PROGRESS NOTE         Patient Name  Hakeem Pearson   Date of Birth 1990   [facility] [id number]   Medical Record Number  [id number]      Age  27 y.o. PCP None   Admit date:  11/9/2021    Room Number  576/25  @ [facility]   Date of Service  11/16/2021         E & M PROGRESS NOTE:         HISTORY       CC:  \"psychosis\"  HISTORY OF PRESENT ILLNESS/INTERVAL HISTORY:  (reviewed/updated 11/16/2021). per initial evaluation: The patient, Hakeem Pearson, is a 27 y.o. BLACK/ male with a past psychiatric history significant for schizoaffective vs bipolar disorder, who presents at this time with complaints of (and/or evidence of) the following emotional symptoms: psychotic behavior and eliseo. Additional symptomatology include poor self care. The above symptoms have been present for 2+ weeks. These symptoms are of moderate to high severity. These symptoms are constant in nature. The patient's condition has been precipitated by psychosocial stressors. Patient's condition made worse by treatment noncompliance. UDS: negative; BAL=0.     Per admission documentation, the patient was picked up by police following suspicious car report; he had run out of gas en route to [place] and was unable to fully explain his situation. Patient has been guarded and uncooperative on the unit, pacing the hallway and responding to internal stimuli. Patient grossly incoherent on interview, dismissive and suspicious.       The patient is a poor and uncooperative historian. The patient corroborates the above narrative. The patient contracts for safety on the unit and gives consent for the team to contact collateral. The patient is not amenable to initiating treatment while on the unit. Patient states he ran out of gas and offers little information regarding how he came to be in [place] or why he was traveling to [place] from his native [place] -- he states he lives in [place] though this is not corroborated.  Patient ends interview abruptly and continues to pace the unit. Per collateral, patient has not been compliant with medications in the past and has had prior episodes of psychosis. 11/11 - the patient slept 4 hours overnight. He remains labile and disorganized, with inappropriate actions such as masturbating at he nurses' station. Patient refused medications and labwork, still pacing the unit with an odd affect. No PRNs given as patient is verbally redirectable, no physical aggression. Court order pending for 11/12 for forced medication during TDO hearing. 11/12 - patient remains uncooperative with treatment; he is bizarre and illogical on interview, still stating his family is dead. He slept 6.5 hours, refused all assessments, vitals and medications. Patient irritable, wants to leave the hospital today. Per family, he has the keys to his mother's car and will not return them (per SW, the keys cannot be taken from the hospital unless he releases his personal property to them). 11/15 - no significant improvements noted. Patient slept 5 hours overnight, he remains disorganized, guarded, states his family is 'dead' and refuses all assessments. Patient got no PRNs for agitation as verbal redirection has been successful. Patient discharge focused, unable to provide any meaningful information about his admission but has been refusing the team's offer to contact his family for further treatment planning. Patient compliant with court ordered medication. 11/16 - no acute overnight events. Patient slept 7.75 hours overnight. He is compliant with court ordered medication and denies SI/HI/AVH; patient remains guarded and suspicious about staff and will not speak with his family despite encouragement from team. He is isolative to room today and discharge focused. No agitation PRNs given. SIDE EFFECTS: (reviewed/updated 11/16/2021)  None reported or admitted to.      ALLERGIES:(reviewed/updated 11/16/2021)  No Known Allergies   MEDICATIONS PRIOR TO ADMISSION:(reviewed/updated 11/16/2021)  No medications prior to admission. PAST MEDICAL HISTORY: Past medical history from the initial psychiatric evaluation has been reviewed (reviewed/updated 11/16/2021) with no additional updates (I asked patient and no additional past medical history provided). History reviewed. No pertinent past medical history. History reviewed. No pertinent surgical history. SOCIAL HISTORY: Social history from the initial psychiatric evaluation has been reviewed (reviewed/updated 11/16/2021) with no additional updates (I asked patient and no additional social history provided). Social History     Socioeconomic History    Marital status: SINGLE     Spouse name: Not on file    Number of children: Not on file    Years of education: Not on file    Highest education level: Not on file   Occupational History    Not on file   Tobacco Use    Smoking status: Current Some Day Smoker    Smokeless tobacco: Not on file   Substance and Sexual Activity    Alcohol use: Not Currently    Drug use: Not Currently    Sexual activity: Not Currently   Other Topics Concern    Not on file   Social History Narrative    Not on file     Social Determinants of Health     Financial Resource Strain:     Difficulty of Paying Living Expenses: Not on file   Food Insecurity:     Worried About Running Out of Food in the Last Year: Not on file    Ana of Food in the Last Year: Not on file   Transportation Needs:     Lack of Transportation (Medical): Not on file    Lack of Transportation (Non-Medical):  Not on file   Physical Activity:     Days of Exercise per Week: Not on file    Minutes of Exercise per Session: Not on file   Stress:     Feeling of Stress : Not on file   Social Connections:     Frequency of Communication with Friends and Family: Not on file    Frequency of Social Gatherings with Friends and Family: Not on file    Attends Taoist Services: Not on file   [address] "[employer] or Organizations: Not on file    Attends Club or Organization Meetings: Not on file    Marital Status: Not on file   Intimate Partner Violence:     Fear of Current or Ex-Partner: Not on file    Emotionally Abused: Not on file    Physically Abused: Not on file    Sexually Abused: Not on file   Housing Stability:     Unable to Pay for Housing in the Last Year: Not on file    Number of [place] in the Last Year: Not on file    Unstable Housing in the Last Year: Not on file      FAMILY HISTORY: Family history from the initial psychiatric evaluation has been reviewed (reviewed/updated 11/16/2021) with no additional updates (I asked patient and no additional family history provided). History reviewed. No pertinent family history. REVIEW OF SYSTEMS: (reviewed/updated 11/16/2021)  Appetite:no change from normal   Sleep: poor with DIMS (difficulty initiating & maintaining sleep)   All other Review of Systems: Negative except per HPI         [facility] (MSE):    MSE FINDINGS ARE WITHIN NORMAL LIMITS (WNL) UNLESS OTHERWISE STATED BELOW. ( ALL OF THE BELOW CATEGORIES OF THE MSE HAVE BEEN REVIEWED (reviewed 11/16/2021) AND UPDATED AS DEEMED APPROPRIATE )  General Presentation age appropriate, guarded and uncooperative   Orientation disorganized, oriented to time, place and person   Vital Signs  See below (reviewed 11/16/2021); Vital Signs (BP, Pulse, & Temp) are within normal limits if not listed below.    Gait and Station Stable/steady, no ataxia   Musculoskeletal System No extrapyramidal symptoms (EPS); no abnormal muscular movements or Tardive Dyskinesia (TD); muscle strength and tone are within normal limits   Language No aphasia or dysarthria   Speech:  hypoverbal and increased latency of response   Thought Processes illogical; normal rate of thoughts; poor abstract reasoning/computation   Thought Associations loose associations Thought Content preoccupations and internally preoccupied   Suicidal Ideations none   Homicidal Ideations none   Mood:  hostile  and irritable   Affect:  constricted and increased in intensity   Memory recent  fair   Memory remote:  intact   Concentration/Attention:  intact   Fund of Knowledge average   Insight:  poor   Reliability fair   Judgment:  poor          VITALS:     No data found. Wt Readings from Last 3 Encounters:   11/10/21 90.7 kg (200 lb)     Temp Readings from Last 3 Encounters:   No data found for Temp     BP Readings from Last 3 Encounters:   No data found for BP     Pulse Readings from Last 3 Encounters:   No data found for Pulse            DATA     LABORATORY DATA:(reviewed/updated 11/16/2021)  No results found for this or any previous visit (from the past 24 hour(s)). No results found for: VALF2, VALAC, VALP, VALPR, DS6, CRBAM, CRBAMP, CARB2, XCRBAM  No results found for: LITHM   RADIOLOGY REPORTS:(reviewed/updated 11/16/2021)  No results found.        MEDICATIONS     ALL MEDICATIONS:   Current Facility-Administered Medications   Medication Dose Route Frequency    ARIPiprazole (ABILIFY) 1 mg/mL oral soln 15 mg  15 mg Oral DAILY    Or    haloperidol lactate (HALDOL) injection 5 mg +++COURT ORDERED MEDICATION FOR REFUSAL OF ORAL ARIPIPRAZOLE+++  5 mg IntraMUSCular DAILY    valproic acid (as sodium salt) (DEPAKENE) 250 mg/5 mL (5 mL) oral solution 750 mg  750 mg Oral Q12H    Or    LORazepam (ATIVAN) injection 1 mg +++COURT ORDERED MEDICATION FOR REFUSAL OF VALPROIC ACID+++  1 mg IntraMUSCular Q12H    OLANZapine (ZyPREXA) tablet 5 mg  5 mg Oral Q6H PRN    haloperidol lactate (HALDOL) injection 5 mg  5 mg IntraMUSCular Q6H PRN    benztropine (COGENTIN) tablet 1 mg  1 mg Oral BID PRN    diphenhydrAMINE (BENADRYL) injection 50 mg  50 mg IntraMUSCular BID PRN    hydrOXYzine HCL (ATARAX) tablet 50 mg  50 mg Oral TID PRN    LORazepam (ATIVAN) injection 1 mg  1 mg IntraMUSCular Q4H PRN    traZODone (DESYREL) tablet 50 mg  50 mg Oral QHS PRN    acetaminophen (TYLENOL) tablet 650 mg  650 mg Oral Q4H PRN    magnesium hydroxide (MILK OF MAGNESIA) 400 mg/5 mL oral suspension 30 mL  30 mL Oral DAILY PRN      SCHEDULED MEDICATIONS:   Current Facility-Administered Medications   Medication Dose Route Frequency    ARIPiprazole (ABILIFY) 1 mg/mL oral soln 15 mg  15 mg Oral DAILY    Or    haloperidol lactate (HALDOL) injection 5 mg +++COURT ORDERED MEDICATION FOR REFUSAL OF ORAL ARIPIPRAZOLE+++  5 mg IntraMUSCular DAILY    valproic acid (as sodium salt) (DEPAKENE) 250 mg/5 mL (5 mL) oral solution 750 mg  750 mg Oral Q12H    Or    LORazepam (ATIVAN) injection 1 mg +++COURT ORDERED MEDICATION FOR REFUSAL OF VALPROIC ACID+++  1 mg IntraMUSCular Q12H          ASSESSMENT & PLAN     DIAGNOSES REQUIRING ACTIVE TREATMENT AND MONITORING: (reviewed/updated 11/16/2021)  Patient Active Hospital Problem List:   Assessment: patient with decompensation in the field secondary to medication non-compliance. He is significantly impaired and will require stabilization with typical eliseo agents. Plan:  COURT ORDERED MEDICATIONS:   Abilify oral soln 15 mg PO *OR* Haldol 5 mg IM QDAY for psychosis   Depakote oral soln 750 mg *OR* Ativan 1 mg Q12H for eliseo   VPA level due 11/17    - IGM therapy as tolerated  - Expand database / obtain collateral  - Dispo planning (home when stable)     I will continue to monitor blood levels (valproic acid---a drug with a narrow therapeutic index= NTI) and associated labs for drug therapy implemented that require intense monitoring for toxicity as deemed appropriate based on current medication side effects and pharmacodynamically determined drug 1/2 lives.     In summary, Myrtle Robles, is a 27 y.o.  male who presents with a severe exacerbation of the principal diagnosis of Bipolar disorder, current episode manic severe with psychotic features ([facility] [address]    Patient's condition is not improving. Patient requires continued inpatient hospitalization for further stabilization, safety monitoring and medication management. I will continue to coordinate the provision of individual, milieu, occupational, group, and substance abuse therapies to address target symptoms/diagnoses as deemed appropriate for the individual patient. A coordinated, multidisplinary treatment team round was conducted with the patient (this team consists of the nurse, psychiatric unit pharmacist,  and writer). Complete current electronic health record for patient has been reviewed today including consultant notes, ancillary staff notes, nurses and psychiatric tech notes. Suicide risk assessment completed and patient deemed to be of low risk for suicide at this time. The following regarding medications was addressed during rounds with patient:   the risks and benefits of the proposed medication. The patient was given the opportunity to ask questions. Informed consent given to the use of the above medications. Will continue to adjust psychiatric and non-psychiatric medications (see above \"medication\" section and orders section for details) as deemed appropriate & based upon diagnoses and response to treatment. I will continue to order blood tests/labs and diagnostic tests as deemed appropriate and review results as they become available (see orders for details and above listed lab/test results). I will order psychiatric records from previous [facility] hospitals to further elucidate the nature of patient's psychopathology and review once available. I will gather additional collateral information from friends, family and o/p treatment team to further elucidate the nature of patient's psychopathology and baselline level of psychiatric functioning.          I certify that this patient's inpatient psychiatric hospital services furnished since the previous certification were, and continue to be, required for treatment that could reasonably be expected to improve the patient's condition, or for diagnostic study, and that the patient continues to need, on a daily basis, active treatment furnished directly by or requiring the supervision of inpatient psychiatric facility personnel. In addition, the hospital records show that services furnished were intensive treatment services, admission or related services, or equivalent services.     EXPECTED DISCHARGE DATE/DAY: TBD     DISPOSITION: Home       Signed By:   Che Reno MD  11/16/2021

## 2021-11-16 NOTE — BH NOTES
Behavioral Health Treatment Team Note      Patient goal(s) for today: continue taking medication as prescribed, engage in unit activities, participate in hygiene/ADLs, follow directions from staff, contact support team, prepare for discharge  Treatment team focus/goals: medication adjustments, dispo planning, update support system  Progress note: Pt was seen in treatment team this morning. Pt is alert and oriented. Pt denies SI/HI. Pt's mood is irritable, affect is flat. Pt's thought process is illogical with paranoid delusions - pt continues to report his mother is dead. Pt refusing to include family stating he is independent and a man that does not need to involved his girlfriend or others. Pt's insight and judgment is poor, reliability is poor. Social work department will continue to coordinate discharge plans.      LOS:  7                       Expected LOS: TBD     Financial concerns/prescription coverage:  Medicaid   Date of last family contact:  Pt is refusing to sign DINO for family.                     Family requesting physician contact today:  No  Discharge plan: TBD  Guns in the home: None reported.                                                       Outpatient provider(s): To be linked.

## 2021-11-16 NOTE — GROUP NOTE
SERA  GROUP DOCUMENTATION INDIVIDUAL                                                                          Group Therapy Note    Date: 11/16/2021    Group Start Time: 1500  Group End Time: 1600  Group Topic: Recreational/Music Therapy    [address] [facility] 3 ACUTE BEHAV [facility]    [place], [address] [facility] GROUP DOCUMENTATION GROUP    Group Therapy Note    Attendees: 6         Attendance: Did not attend    Patient's Goal:      Interventions/techniquesNyasia Brush

## 2021-11-16 NOTE — BH NOTES
During shift, pt remained in bed. Pt refuses to answer questions pertaining to SI, HI, A/V hallucinations. Meal/med compliant. Currently, pt is resting in bed with eyes closed. No signs of distress nor made any further complaints. Q15 min safety checks. Locked Unit. Will continue to monitor. Patient slept this shift 7.25 hours, respirations noted even and unlabored. Safe environment maintained.

## 2021-11-16 NOTE — BH NOTES
Hourly rounds have been made to assure patient safety and attend to care needs as they arise. PT is alert and oriented to person. He denies SI/HI, AVH, depression and anxiety. Pt is meal and medication compliant, seen pacing in the hallway. He interacts minimally with staff, affect is flat, and he does not make appropriate eye contact. Monitoring will continue for condition changes.

## 2021-11-16 NOTE — BH NOTES
GROUP THERAPY PROGRESS NOTE    Patient did not participate in Coping Skills group.      Pau Wade LPC LSATP CSAC

## 2021-11-17 LAB
CHOLEST SERPL-MCNC: 196 MG/DL
EST. AVERAGE GLUCOSE BLD GHB EST-MCNC: 111 MG/DL
HBA1C MFR BLD: 5.5 % (ref 4–5.6)
HDLC SERPL-MCNC: 54 MG/DL
HDLC SERPL: 3.6 {RATIO} (ref 0–5)
LDLC SERPL CALC-MCNC: 122 MG/DL (ref 0–100)
TRIGL SERPL-MCNC: 100 MG/DL (ref ?–150)
TSH SERPL DL<=0.05 MIU/L-ACNC: 1.18 UIU/ML (ref 0.36–3.74)
VALPROATE SERPL-MCNC: 82 UG/ML (ref 50–100)
VLDLC SERPL CALC-MCNC: 20 MG/DL

## 2021-11-17 PROCEDURE — 80061 LIPID PANEL: CPT

## 2021-11-17 PROCEDURE — 65220000003 HC RM SEMIPRIVATE PSYCH

## 2021-11-17 PROCEDURE — 99232 SBSQ HOSP IP/OBS MODERATE 35: CPT | Performed by: PSYCHIATRY & NEUROLOGY

## 2021-11-17 PROCEDURE — 84443 ASSAY THYROID STIM HORMONE: CPT

## 2021-11-17 PROCEDURE — 74011250637 HC RX REV CODE- 250/637: Performed by: PSYCHIATRY & NEUROLOGY

## 2021-11-17 PROCEDURE — 80164 ASSAY DIPROPYLACETIC ACD TOT: CPT

## 2021-11-17 PROCEDURE — 83036 HEMOGLOBIN GLYCOSYLATED A1C: CPT

## 2021-11-17 PROCEDURE — 36415 COLL VENOUS BLD VENIPUNCTURE: CPT

## 2021-11-17 RX ADMIN — VALPROIC ACID 750 MG: 250 SOLUTION ORAL at 21:19

## 2021-11-17 RX ADMIN — ARIPIPRAZOLE 15 MG: 1 SOLUTION ORAL at 08:22

## 2021-11-17 RX ADMIN — VALPROIC ACID 750 MG: 250 SOLUTION ORAL at 08:22

## 2021-11-17 NOTE — GROUP NOTE
SERA  GROUP DOCUMENTATION INDIVIDUAL                                                                          Group Therapy Note    Date: 11/17/2021    Group Start Time: 1500  Group End Time: 1600  Group Topic: Recreational/Music Therapy    [facility] [place] [address] ACUTE BEHAV [facility]    [place], [address] [facility] GROUP DOCUMENTATION GROUP    Group Therapy Note    Attendees: 11         Attendance: Attended    Patient's Goal:  To concentrate on selected task    Interventions/techniques: Supported-crafts,games,music    Interactions: Minimal    Mental Status: Calm and Flat    Behavior/appearance: Cooperative and Needed prompting    Goals Achieved: Attended group session      Additional Notes:  Pt listened to music.     Farideh Tellez

## 2021-11-17 NOTE — PROGRESS NOTES
Laboratory Monitoring for Valproic Acid    This patient is currently prescribed the following medication(s):   Current Facility-Administered Medications   Medication Dose Route Frequency    ARIPiprazole (ABILIFY) 1 mg/mL oral soln 15 mg  15 mg Oral DAILY    Or    haloperidol lactate (HALDOL) injection 5 mg +++COURT ORDERED MEDICATION FOR REFUSAL OF ORAL ARIPIPRAZOLE+++  5 mg IntraMUSCular DAILY    valproic acid (as sodium salt) (DEPAKENE) 250 mg/5 mL (5 mL) oral solution 750 mg  750 mg Oral Q12H    Or    LORazepam (ATIVAN) injection 1 mg +++COURT ORDERED MEDICATION FOR REFUSAL OF VALPROIC ACID+++  1 mg IntraMUSCular Q12H       The following labs have been completed for monitoring of valproic acid:    Valproic Acid Serum Concentration  Lab Results   Component Value Date/Time    Valproic acid 82 11/17/2021 05:46 AM         Hepatic Function  Lab Results   Component Value Date/Time    Bilirubin, total 0.6 11/09/2021 08:31 PM    Protein, total 7.2 11/09/2021 08:31 PM    Albumin 4.0 11/09/2021 08:31 PM    Globulin 3.2 11/09/2021 08:31 PM    A-G Ratio 1.3 11/09/2021 08:31 PM    ALT (SGPT) 24 11/09/2021 08:31 PM    AST (SGOT) 23 11/09/2021 08:31 PM    Alk. phosphatase 84 11/09/2021 08:31 PM       Hematology  Lab Results   Component Value Date/Time    WBC 7.5 11/09/2021 08:31 PM    RBC 4.93 11/09/2021 08:31 PM    HGB 13.3 11/09/2021 08:31 PM    HCT 41.1 11/09/2021 08:31 PM    MCV 83.4 11/09/2021 08:31 PM    MCH 27.0 11/09/2021 08:31 PM    MCHC 32.4 11/09/2021 08:31 PM    RDW 13.4 11/09/2021 08:31 PM    PLATELET 387 47/75/8245 08:31 PM       Assessment/Plan:  Valproic acid level drawn on 11/17 @ 05:46 AM is within therapeutic limits, 82 mcg/mL ( mcg/mL). Level was drawn appropriately 8 hours post-dose and is reflective of steady state concentration. May consider dose increase if clinically indicated.       [address] [facility], [address], [facility]  [phone]

## 2021-11-17 NOTE — PROGRESS NOTES
Problem: Psychosis  Goal: *STG: Decreased hallucinations  Outcome: Progressing Towards Goal  Goal: *STG: Remains safe in hospital  Outcome: Progressing Towards Goal  Goal: *STG: Seeks staff when feelings of self harm or harm towards others arise  Outcome: Progressing Towards Goal  Goal: *STG: Patient will develop strategies to regulate emotions and corresponding behaviors  Outcome: Progressing Towards Goal  Goal: *STG: Participates in individual and group therapy  Outcome: Progressing Towards Goal  Goal: *STG/LTG: Complies with medication therapy  Outcome: Progressing Towards Goal     Problem: Patient Education: Go to Patient Education Activity  Goal: Patient/Family Education  Outcome: Progressing Towards Goal     Problem: Patient Education: Go to Patient Education Activity  Goal: Patient/Family Education  Outcome: Progressing Towards Goal     Problem: Discharge Planning  Goal: *Discharge to safe environment  Outcome: Progressing Towards Goal

## 2021-11-17 NOTE — PROGRESS NOTES
0800 Patient mute and pacing around unit. Patient refuses to answer assessment questions and refuses to have vitals taken. Will continue to monitor. 1000 Patient appears irritable and continues to walk around unit. Will continue to monitor. [id number] Patient has been pacing around unit and isolating. He declined phone calls and appears irritable. Will continue to monitor.

## 2021-11-17 NOTE — GROUP NOTE
SERA  GROUP DOCUMENTATION INDIVIDUAL                                                                          Group Therapy Note    Date: 11/17/2021    Group Start Time: 1000  Group End Time: 1100  Group Topic: Topic Group    [facility] [place] 3 ACUTE BEHAV [facility]    [place], [facility] GROUP DOCUMENTATION GROUP    Group Therapy Note    Attendees: 6         Attendance: Did not attend    Patient's Goal:      Interventions/techniquesNyasia Brush

## 2021-11-17 NOTE — BH NOTES
PSYCHIATRIC PROGRESS NOTE         Patient Name  Gabriela Cota   Date of Birth 1990   [facility] [id number]   Medical Record Number  [id number]      Age  27 y.o. PCP None   Admit date:  11/9/2021    Room Number  555/41  @ [facility]   Date of Service  11/17/2021         E & M PROGRESS NOTE:         HISTORY       CC:  \"psychosis\"  HISTORY OF PRESENT ILLNESS/INTERVAL HISTORY:  (reviewed/updated 11/17/2021). per initial evaluation: The patient, Gabriela Cota, is a 27 y.o. BLACK/ male with a past psychiatric history significant for schizoaffective vs bipolar disorder, who presents at this time with complaints of (and/or evidence of) the following emotional symptoms: psychotic behavior and eliseo. Additional symptomatology include poor self care. The above symptoms have been present for 2+ weeks. These symptoms are of moderate to high severity. These symptoms are constant in nature. The patient's condition has been precipitated by psychosocial stressors. Patient's condition made worse by treatment noncompliance. UDS: negative; BAL=0.     Per admission documentation, the patient was picked up by police following suspicious car report; he had run out of gas en route to [place] and was unable to fully explain his situation. Patient has been guarded and uncooperative on the unit, pacing the hallway and responding to internal stimuli. Patient grossly incoherent on interview, dismissive and suspicious.       The patient is a poor and uncooperative historian. The patient corroborates the above narrative. The patient contracts for safety on the unit and gives consent for the team to contact collateral. The patient is not amenable to initiating treatment while on the unit. Patient states he ran out of gas and offers little information regarding how he came to be in [place] or why he was traveling to [place] from his native [place] -- he states he lives in [place] though this is not corroborated.  Patient ends interview abruptly and continues to pace the unit. Per collateral, patient has not been compliant with medications in the past and has had prior episodes of psychosis. 11/11 - the patient slept 4 hours overnight. He remains labile and disorganized, with inappropriate actions such as masturbating at he nurses' station. Patient refused medications and labwork, still pacing the unit with an odd affect. No PRNs given as patient is verbally redirectable, no physical aggression. Court order pending for 11/12 for forced medication during TDO hearing. 11/12 - patient remains uncooperative with treatment; he is bizarre and illogical on interview, still stating his family is dead. He slept 6.5 hours, refused all assessments, vitals and medications. Patient irritable, wants to leave the hospital today. Per family, he has the keys to his mother's car and will not return them (per SW, the keys cannot be taken from the hospital unless he releases his personal property to them). 11/15 - no significant improvements noted. Patient slept 5 hours overnight, he remains disorganized, guarded, states his family is 'dead' and refuses all assessments. Patient got no PRNs for agitation as verbal redirection has been successful. Patient discharge focused, unable to provide any meaningful information about his admission but has been refusing the team's offer to contact his family for further treatment planning. Patient compliant with court ordered medication. 11/16 - no acute overnight events. Patient slept 7.75 hours overnight. He is compliant with court ordered medication and denies SI/HI/AVH; patient remains guarded and suspicious about staff and will not speak with his family despite encouragement from team. He is isolative to room today and discharge focused. No agitation PRNs given. 11/17 - the patient has been isolative, disorganized but coherent, refusing vital signs but compliant with court ordered medication.  He is less internally preoccupied, discharge focused, continues to refuse any communication with his family, whom he states do not exist or have  previously. Of note, the patient lists his mother's name as 'Kamari,' and states he last saw her in , then 0. He is unable to reality test, repeatedly states he will be continuing on to [place] to meet his gf. VPA level 82. SIDE EFFECTS: (reviewed/updated 2021)  None reported or admitted to. ALLERGIES:(reviewed/updated 2021)  No Known Allergies   MEDICATIONS PRIOR TO ADMISSION:(reviewed/updated 2021)  No medications prior to admission. PAST MEDICAL HISTORY: Past medical history from the initial psychiatric evaluation has been reviewed (reviewed/updated 2021) with no additional updates (I asked patient and no additional past medical history provided). History reviewed. No pertinent past medical history. History reviewed. No pertinent surgical history. SOCIAL HISTORY: Social history from the initial psychiatric evaluation has been reviewed (reviewed/updated 2021) with no additional updates (I asked patient and no additional social history provided).    Social History     Socioeconomic History    Marital status: SINGLE     Spouse name: Not on file    Number of children: Not on file    Years of education: Not on file    Highest education level: Not on file   Occupational History    Not on file   Tobacco Use    Smoking status: Current Some Day Smoker    Smokeless tobacco: Not on file   Substance and Sexual Activity    Alcohol use: Not Currently    Drug use: Not Currently    Sexual activity: Not Currently   Other Topics Concern    Not on file   Social History Narrative    Not on file     Social Determinants of Health     Financial Resource Strain:     Difficulty of Paying Living Expenses: Not on file   Food Insecurity:     Worried About Running Out of Food in the Last Year: Not on file    [address] in the Last Year: Not on file   Transportation Needs:     Lack of Transportation (Medical): Not on file    Lack of Transportation (Non-Medical): Not on file   Physical Activity:     Days of Exercise per Week: Not on file    Minutes of Exercise per Session: Not on file   Stress:     Feeling of Stress : Not on file   Social Connections:     Frequency of Communication with Friends and Family: Not on file    Frequency of Social Gatherings with Friends and Family: Not on file    Attends Hindu Services: Not on file    Active Member of [address] [place] [address] [place] or Organizations: Not on file    Attends Club or Organization Meetings: Not on file    Marital Status: Not on file   Intimate Partner Violence:     Fear of Current or Ex-Partner: Not on file    Emotionally Abused: Not on file    Physically Abused: Not on file    Sexually Abused: Not on file   Housing Stability:     Unable to Pay for Housing in the Last Year: Not on file    Number of [place] in the Last Year: Not on file    Unstable Housing in the Last Year: Not on file      FAMILY HISTORY: Family history from the initial psychiatric evaluation has been reviewed (reviewed/updated 11/17/2021) with no additional updates (I asked patient and no additional family history provided). History reviewed. No pertinent family history. REVIEW OF SYSTEMS: (reviewed/updated 11/17/2021)  Appetite:no change from normal   Sleep: poor with DIMS (difficulty initiating & maintaining sleep)   All other Review of Systems: Negative except per HPI         [address] (MSE):    MSE FINDINGS ARE WITHIN NORMAL LIMITS (WNL) UNLESS OTHERWISE STATED BELOW. ( ALL OF THE BELOW CATEGORIES OF THE MSE HAVE BEEN REVIEWED (reviewed 11/17/2021) AND UPDATED AS DEEMED APPROPRIATE )  General Presentation age appropriate, guarded and uncooperative   Orientation disorganized, oriented to time, place and person   Vital Signs  See below (reviewed 11/17/2021);  Vital Signs (BP, Pulse, & Temp) are within normal limits if not listed below.    Gait and Station Stable/steady, no ataxia   Musculoskeletal System No extrapyramidal symptoms (EPS); no abnormal muscular movements or Tardive Dyskinesia (TD); muscle strength and tone are within normal limits   Language No aphasia or dysarthria   Speech:  hypoverbal and increased latency of response   Thought Processes illogical; normal rate of thoughts; poor abstract reasoning/computation   Thought Associations loose associations   Thought Content preoccupations and internally preoccupied   Suicidal Ideations none   Homicidal Ideations none   Mood:  hostile  and irritable   Affect:  constricted and increased in intensity   Memory recent  fair   Memory remote:  intact   Concentration/Attention:  intact   Fund of Knowledge average   Insight:  poor   Reliability fair   Judgment:  poor          VITALS:     Patient Vitals for the past 24 hrs:   Resp   11/16/21 2212 17     Wt Readings from Last 3 Encounters:   11/10/21 90.7 kg (200 lb)     Temp Readings from Last 3 Encounters:   No data found for Temp     BP Readings from Last 3 Encounters:   No data found for BP     Pulse Readings from Last 3 Encounters:   No data found for Pulse            DATA     LABORATORY DATA:(reviewed/updated 11/17/2021)  Recent Results (from the past 24 hour(s))   LIPID PANEL    Collection Time: 11/17/21  5:46 AM   Result Value Ref Range    Cholesterol, total 196 <200 MG/DL    Triglyceride 100 <150 MG/DL    HDL Cholesterol 54 MG/DL    LDL, calculated 122 (H) 0 - 100 MG/DL    VLDL, calculated 20 MG/DL    CHOL/HDL Ratio 3.6 0.0 - 5.0     TSH 3RD GENERATION    Collection Time: 11/17/21  5:46 AM   Result Value Ref Range    TSH 1.18 0.36 - 3.74 uIU/mL   VALPROIC ACID    Collection Time: 11/17/21  5:46 AM   Result Value Ref Range    Valproic acid 82 50 - 100 ug/ml     Lab Results   Component Value Date/Time    Valproic acid 82 11/17/2021 05:46 AM     No results found for: [facility] [place] [facility] RADIOLOGY REPORTS:(reviewed/updated 11/17/2021)  No results found.        MEDICATIONS     ALL MEDICATIONS:   Current Facility-Administered Medications   Medication Dose Route Frequency    ARIPiprazole (ABILIFY) 1 mg/mL oral soln 15 mg  15 mg Oral DAILY    Or    haloperidol lactate (HALDOL) injection 5 mg +++COURT ORDERED MEDICATION FOR REFUSAL OF ORAL ARIPIPRAZOLE+++  5 mg IntraMUSCular DAILY    valproic acid (as sodium salt) (DEPAKENE) 250 mg/5 mL (5 mL) oral solution 750 mg  750 mg Oral Q12H    Or    LORazepam (ATIVAN) injection 1 mg +++COURT ORDERED MEDICATION FOR REFUSAL OF VALPROIC ACID+++  1 mg IntraMUSCular Q12H    OLANZapine (ZyPREXA) tablet 5 mg  5 mg Oral Q6H PRN    haloperidol lactate (HALDOL) injection 5 mg  5 mg IntraMUSCular Q6H PRN    benztropine (COGENTIN) tablet 1 mg  1 mg Oral BID PRN    diphenhydrAMINE (BENADRYL) injection 50 mg  50 mg IntraMUSCular BID PRN    hydrOXYzine HCL (ATARAX) tablet 50 mg  50 mg Oral TID PRN    LORazepam (ATIVAN) injection 1 mg  1 mg IntraMUSCular Q4H PRN    traZODone (DESYREL) tablet 50 mg  50 mg Oral QHS PRN    acetaminophen (TYLENOL) tablet 650 mg  650 mg Oral Q4H PRN    magnesium hydroxide (MILK OF MAGNESIA) 400 mg/5 mL oral suspension 30 mL  30 mL Oral DAILY PRN      SCHEDULED MEDICATIONS:   Current Facility-Administered Medications   Medication Dose Route Frequency    ARIPiprazole (ABILIFY) 1 mg/mL oral soln 15 mg  15 mg Oral DAILY    Or    haloperidol lactate (HALDOL) injection 5 mg +++COURT ORDERED MEDICATION FOR REFUSAL OF ORAL ARIPIPRAZOLE+++  5 mg IntraMUSCular DAILY    valproic acid (as sodium salt) (DEPAKENE) 250 mg/5 mL (5 mL) oral solution 750 mg  750 mg Oral Q12H    Or    LORazepam (ATIVAN) injection 1 mg +++COURT ORDERED MEDICATION FOR REFUSAL OF VALPROIC ACID+++  1 mg IntraMUSCular Q12H          ASSESSMENT & PLAN     DIAGNOSES REQUIRING ACTIVE TREATMENT AND MONITORING: (reviewed/updated 11/17/2021)  Patient Active Hospital Problem List:   Assessment: patient with decompensation in the field secondary to medication non-compliance. He is significantly impaired and will require stabilization with typical eliseo agents. Plan:  COURT ORDERED MEDICATIONS:   Abilify oral soln 15 mg PO *OR* Haldol 5 mg IM QDAY for psychosis   Depakote oral soln 750 mg *OR* Ativan 1 mg Q12H for eliseo   VPA level 82 on 11/17    - IGM therapy as tolerated  - Expand database / obtain collateral  - Dispo planning (home when stable)     I will continue to monitor blood levels (valproic acid---a drug with a narrow therapeutic index= NTI) and associated labs for drug therapy implemented that require intense monitoring for toxicity as deemed appropriate based on current medication side effects and pharmacodynamically determined drug 1/2 lives. In summary, Deneen Matthews, is a 27 y.o.  male who presents with a severe exacerbation of the principal diagnosis of Bipolar disorder, current episode manic severe with psychotic features ([address]    Patient's condition is not improving. Patient requires continued inpatient hospitalization for further stabilization, safety monitoring and medication management. I will continue to coordinate the provision of individual, milieu, occupational, group, and substance abuse therapies to address target symptoms/diagnoses as deemed appropriate for the individual patient. A coordinated, multidisplinary treatment team round was conducted with the patient (this team consists of the nurse, psychiatric unit pharmacist,  and writer). Complete current electronic health record for patient has been reviewed today including consultant notes, ancillary staff notes, nurses and psychiatric tech notes. Suicide risk assessment completed and patient deemed to be of low risk for suicide at this time. The following regarding medications was addressed during rounds with patient:   the risks and benefits of the proposed medication.  The patient was given the opportunity to ask questions. Informed consent given to the use of the above medications. Will continue to adjust psychiatric and non-psychiatric medications (see above \"medication\" section and orders section for details) as deemed appropriate & based upon diagnoses and response to treatment. I will continue to order blood tests/labs and diagnostic tests as deemed appropriate and review results as they become available (see orders for details and above listed lab/test results). I will order psychiatric records from previous [facility] hospitals to further elucidate the nature of patient's psychopathology and review once available. I will gather additional collateral information from friends, family and o/p treatment team to further elucidate the nature of patient's psychopathology and baselline level of psychiatric functioning. I certify that this patient's inpatient psychiatric hospital services furnished since the previous certification were, and continue to be, required for treatment that could reasonably be expected to improve the patient's condition, or for diagnostic study, and that the patient continues to need, on a daily basis, active treatment furnished directly by or requiring the supervision of inpatient psychiatric facility personnel. In addition, the hospital records show that services furnished were intensive treatment services, admission or related services, or equivalent services.     EXPECTED DISCHARGE DATE/DAY: TBD     DISPOSITION: Home       Signed By:   Rosita Ramirez MD  11/17/2021

## 2021-11-17 NOTE — BH NOTES
Behavioral Health Treatment Team Note      Patient goal(s) for today: continue taking medication as prescribed, engage in unit activities, participate in hygiene/ADLs, follow directions from staff, contact support team, prepare for discharge  Treatment team focus/goals: medication adjustments, dispo planning, update support system  Progress note: Pt was seen in treatment team this morning. Pt is alert and oriented. Pt denies SI/HI. Pt's mood is irritable, affect is flat. Pt's thought process is illogical with delusional themes- pt continues to report his mother is dead. Pt refusing to include family. Pt told staff that he is God. Pt's insight and judgment is poor, reliability is poor. Social work department will continue to coordinate discharge plans.      LOS:  8                       Expected LOS: TBD     Financial concerns/prescription coverage:  Medicaid   Date of last family contact:  Pt is refusing to sign DINO for family.                     Family requesting physician contact today:  No  Discharge plan: TBD  Guns in the home: None reported.                                                       Outpatient provider(s): To be linked.

## 2021-11-17 NOTE — PROGRESS NOTES
Patient observed resting in bed during transition of care. Safety rounds completed. Patient presented as alert and calm, however unwilling to meet tx needs. Patient reported no changes in [address] or medical symptoms. Patient displayed no obvious signs of medical or psychiatric distress. Patient refused to have vital signs assessed. Patent denied any symptoms of depression or anxiety. Patient denied hallucinations of any type. Patient reported no thoughts of self-harm or harm to others. With prompts, patient received HS medications. Patient selectively mute and pacing unit hallways a large part of the night. Patient requested hygiene products in efforts to take a shower.   Patient completed ordered lab work this am.       Problem: Psychosis  Goal: *STG: Decreased delusional thinking  Outcome: Progressing Towards Goal  Goal: *STG: Remains safe in hospital  Outcome: Progressing Towards Goal  Goal: *STG: Seeks staff when feelings of self harm or harm towards others arise  Outcome: Progressing Towards Goal

## 2021-11-18 PROCEDURE — 99232 SBSQ HOSP IP/OBS MODERATE 35: CPT | Performed by: PSYCHIATRY & NEUROLOGY

## 2021-11-18 PROCEDURE — 74011250637 HC RX REV CODE- 250/637: Performed by: PSYCHIATRY & NEUROLOGY

## 2021-11-18 PROCEDURE — 65220000003 HC RM SEMIPRIVATE PSYCH

## 2021-11-18 RX ORDER — HALOPERIDOL 5 MG/ML
5 INJECTION INTRAMUSCULAR ONCE
Status: COMPLETED | OUTPATIENT
Start: 2021-11-19 | End: 2021-11-19

## 2021-11-18 RX ORDER — ARIPIPRAZOLE 1 MG/ML
30 SOLUTION ORAL ONCE
Status: COMPLETED | OUTPATIENT
Start: 2021-11-19 | End: 2021-11-19

## 2021-11-18 RX ADMIN — ARIPIPRAZOLE 15 MG: 1 SOLUTION ORAL at 07:39

## 2021-11-18 RX ADMIN — VALPROIC ACID 750 MG: 250 SOLUTION ORAL at 08:29

## 2021-11-18 RX ADMIN — VALPROIC ACID 750 MG: 250 SOLUTION ORAL at 20:55

## 2021-11-18 NOTE — PROGRESS NOTES
Patient observed resting in bed during transition of care. Safety rounds completed to ensure patient safety. Patient reported no immediate concerns and displayed no obvious signs of medical or psychiatric distress. Patient selectively-mute and irritable at times. Patient denied any symptoms of depression or anxiety. Patient denied hallucinations of any type or thoughts of harm to self/others. Patient refused to allow staff to obtain vital signs during this shift. Patient took prescribed HS medications with no concerns. Patient slept intermittently throughout the night. Patient spent most of the night pacing in the hallway. Staff will continue to assess and monitor patient.                 Problem: Psychosis  Goal: *STG: Decreased hallucinations  Outcome: Progressing Towards Goal  Goal: *STG: Remains safe in hospital  Outcome: Progressing Towards Goal

## 2021-11-18 NOTE — GROUP NOTE
SERA  GROUP DOCUMENTATION INDIVIDUAL                                                                          Group Therapy Note    Date: 11/18/2021    Group Start Time: 1000  Group End Time: 1100  Group Topic: Topic Group    [facility] - [address] ACUTE BEHAV [facility]    [place], [address] [facility] GROUP DOCUMENTATION GROUP    Group Therapy Note    Attendees: 7         Attendance: Attended    Patient's Goal:  To participate in relaxation activity    Interventions/techniques: Supported -art    Interactions: Minimal    Mental Status: Calm    Behavior/appearance: Needed prompting    Goals Achieved:        Additional Notes:  Pt declined active participation-left session early    Rowdy Du

## 2021-11-18 NOTE — BH NOTES
GROUP THERAPY PROGRESS NOTE    Patient did not participate in Coping Skills group.      Edith Ventura LPC LSATP [facility]

## 2021-11-18 NOTE — BH NOTES
Behavioral Health Treatment Team Note      Patient goal(s) for today: continue taking medication as prescribed, engage in unit activities, participate in hygiene/ADLs, follow directions from staff, contact support team, prepare for discharge  Treatment team focus/goals: medication adjustments, dispo planning, update support system  Progress note: \"I've had a rough few days. \" Pt was seen in treatment team this morning. Pt is alert and oriented. Pt denies SI/HI. Pt's mood is WNL, affect is flat. Pt's thought process is coherent and pt reports that he spoke with his mother regarding discharge and her picking him up from hospital. Pt gave verbal permission for team to speak with mother. Family says it usually takes around 3-4 weeks before pt stabilizes. Per family pt does not have a girlfriend and during last manic episode he disappeared going to [place] for a month only calling family when he ran out of money. Pt's insight and judgment is poor, reliability is poor. Social work department will continue to coordinate discharge plans.      LOS:  9                       Expected LOS: TBD     Financial concerns/prescription coverage:  Medicaid   Date of last family contact:  Pt is refusing to sign DINO for family.                     Family requesting physician contact today:  No  Discharge plan: TBD  Guns in the home: None reported.                                                       Outpatient provider(s): To be linked.

## 2021-11-18 NOTE — BH NOTES
Hourly rounds have been made to assure patient safety and attend to care needs as they arise. Pt is alert and oriented to person. He denies SI/HI, AVH, depression and anxiety. Pt is meal and medication compliant, seen pacing in the hallway. He interacts minimally with staff, affect is flat, and he does make appropriate eye contact. Monitoring will continue for condition changes.

## 2021-11-18 NOTE — BH NOTES
PSYCHIATRIC PROGRESS NOTE         Patient Name  Annette Ordoñez   Date of Birth 1990   [facility] [id number]   Medical Record Number  [id number]      Age  27 y.o. PCP None   Admit date:  11/9/2021    Room Number  058/69  @ [facility]   Date of Service  11/18/2021         E & M PROGRESS NOTE:         HISTORY       CC:  \"psychosis\"  HISTORY OF PRESENT ILLNESS/INTERVAL HISTORY:  (reviewed/updated 11/18/2021). per initial evaluation: The patient, Annette Ordoñez, is a 27 y.o. BLACK/ male with a past psychiatric history significant for schizoaffective vs bipolar disorder, who presents at this time with complaints of (and/or evidence of) the following emotional symptoms: psychotic behavior and eliseo. Additional symptomatology include poor self care. The above symptoms have been present for 2+ weeks. These symptoms are of moderate to high severity. These symptoms are constant in nature. The patient's condition has been precipitated by psychosocial stressors. Patient's condition made worse by treatment noncompliance. UDS: negative; BAL=0.     Per admission documentation, the patient was picked up by police following suspicious car report; he had run out of gas en route to [place] and was unable to fully explain his situation. Patient has been guarded and uncooperative on the unit, pacing the hallway and responding to internal stimuli. Patient grossly incoherent on interview, dismissive and suspicious.       The patient is a poor and uncooperative historian. The patient corroborates the above narrative. The patient contracts for safety on the unit and gives consent for the team to contact collateral. The patient is not amenable to initiating treatment while on the unit. Patient states he ran out of gas and offers little information regarding how he came to be in [place] or why he was traveling to [place] from his native [place] -- he states he lives in [place] though this is not corroborated.  Patient ends interview abruptly and continues to pace the unit. Per collateral, patient has not been compliant with medications in the past and has had prior episodes of psychosis. 11/11 - the patient slept 4 hours overnight. He remains labile and disorganized, with inappropriate actions such as masturbating at he nurses' station. Patient refused medications and labwork, still pacing the unit with an odd affect. No PRNs given as patient is verbally redirectable, no physical aggression. Court order pending for 11/12 for forced medication during TDO hearing. 11/12 - patient remains uncooperative with treatment; he is bizarre and illogical on interview, still stating his family is dead. He slept 6.5 hours, refused all assessments, vitals and medications. Patient irritable, wants to leave the hospital today. Per family, he has the keys to his mother's car and will not return them (per SW, the keys cannot be taken from the hospital unless he releases his personal property to them). 11/15 - no significant improvements noted. Patient slept 5 hours overnight, he remains disorganized, guarded, states his family is 'dead' and refuses all assessments. Patient got no PRNs for agitation as verbal redirection has been successful. Patient discharge focused, unable to provide any meaningful information about his admission but has been refusing the team's offer to contact his family for further treatment planning. Patient compliant with court ordered medication. 11/16 - no acute overnight events. Patient slept 7.75 hours overnight. He is compliant with court ordered medication and denies SI/HI/AVH; patient remains guarded and suspicious about staff and will not speak with his family despite encouragement from team. He is isolative to room today and discharge focused. No agitation PRNs given. 11/17 - the patient has been isolative, disorganized but coherent, refusing vital signs but compliant with court ordered medication.  He is less internally preoccupied, discharge focused, continues to refuse any communication with his family, whom he states do not exist or have  previously. Of note, the patient lists his mother's name as 'Kamari,' and states he last saw her in , then 0. He is unable to reality test, repeatedly states he will be continuing on to [place] to meet his gf. VPA level 82.     - patient still pacing the hallway overnight but has been more cooperative this morning and willing to have team reach out to his family. Patient calm, took court ordered medications but remains irritable, discharge focused. Per family collateral, patient is still far from his baseline and this is his 4th episode this year. Family session for tomorrow to go over goals of care. SIDE EFFECTS: (reviewed/updated 2021)  None reported or admitted to. ALLERGIES:(reviewed/updated 2021)  No Known Allergies   MEDICATIONS PRIOR TO ADMISSION:(reviewed/updated 2021)  No medications prior to admission. PAST MEDICAL HISTORY: Past medical history from the initial psychiatric evaluation has been reviewed (reviewed/updated 2021) with no additional updates (I asked patient and no additional past medical history provided). History reviewed. No pertinent past medical history. History reviewed. No pertinent surgical history. SOCIAL HISTORY: Social history from the initial psychiatric evaluation has been reviewed (reviewed/updated 2021) with no additional updates (I asked patient and no additional social history provided).    Social History     Socioeconomic History    Marital status: SINGLE     Spouse name: Not on file    Number of children: Not on file    Years of education: Not on file    Highest education level: Not on file   Occupational History    Not on file   Tobacco Use    Smoking status: Current Some Day Smoker    Smokeless tobacco: Not on file   Substance and Sexual Activity    Alcohol use: Not Currently    Drug use: Not Currently    Sexual activity: Not Currently   Other Topics Concern    Not on file   Social History Narrative    Not on file     Social Determinants of Health     Financial Resource Strain:     Difficulty of Paying Living Expenses: Not on file   Food Insecurity:     Worried About Running Out of Food in the Last Year: Not on file    Ana of Food in the Last Year: Not on file   Transportation Needs:     Lack of Transportation (Medical): Not on file    Lack of Transportation (Non-Medical): Not on file   Physical Activity:     Days of Exercise per Week: Not on file    Minutes of Exercise per Session: Not on file   Stress:     Feeling of Stress : Not on file   Social Connections:     Frequency of Communication with Friends and Family: Not on file    Frequency of Social Gatherings with Friends and Family: Not on file    Attends [employer] Services: Not on file    Active Member of [address] [place] [address] [employer] or Organizations: Not on file    Attends Club or Organization Meetings: Not on file    Marital Status: Not on file   Intimate Partner Violence:     Fear of Current or Ex-Partner: Not on file    Emotionally Abused: Not on file    Physically Abused: Not on file    Sexually Abused: Not on file   Housing Stability:     Unable to Pay for Housing in the Last Year: Not on file    Number of [place] in the Last Year: Not on file    Unstable Housing in the Last Year: Not on file      FAMILY HISTORY: Family history from the initial psychiatric evaluation has been reviewed (reviewed/updated 11/18/2021) with no additional updates (I asked patient and no additional family history provided). History reviewed. No pertinent family history.     REVIEW OF SYSTEMS: (reviewed/updated 11/18/2021)  Appetite:no change from normal   Sleep: poor with DIMS (difficulty initiating & maintaining sleep)   All other Review of Systems: Negative except per HPI         [address] EXAM (MSE):    MSE FINDINGS ARE WITHIN NORMAL LIMITS (WNL) UNLESS OTHERWISE STATED BELOW. ( ALL OF THE BELOW CATEGORIES OF THE MSE HAVE BEEN REVIEWED (reviewed 11/18/2021) AND UPDATED AS DEEMED APPROPRIATE )  General Presentation age appropriate, guarded and uncooperative   Orientation disorganized, oriented to time, place and person   Vital Signs  See below (reviewed 11/18/2021); Vital Signs (BP, Pulse, & Temp) are within normal limits if not listed below. Gait and Station Stable/steady, no ataxia   Musculoskeletal System No extrapyramidal symptoms (EPS); no abnormal muscular movements or Tardive Dyskinesia (TD); muscle strength and tone are within normal limits   Language No aphasia or dysarthria   Speech:  hypoverbal and increased latency of response   Thought Processes illogical; normal rate of thoughts; poor abstract reasoning/computation   Thought Associations loose associations   Thought Content preoccupations and internally preoccupied   Suicidal Ideations none   Homicidal Ideations none   Mood:  hostile  and irritable   Affect:  constricted and increased in intensity   Memory recent  fair   Memory remote:  intact   Concentration/Attention:  intact   Fund of Knowledge average   Insight:  poor   Reliability fair   Judgment:  poor          VITALS:     Patient Vitals for the past 24 hrs:   Resp   11/17/21 2203 17     Wt Readings from Last 3 Encounters:   11/10/21 90.7 kg (200 lb)     Temp Readings from Last 3 Encounters:   No data found for Temp     BP Readings from Last 3 Encounters:   No data found for BP     Pulse Readings from Last 3 Encounters:   No data found for Pulse            DATA     LABORATORY DATA:(reviewed/updated 11/18/2021)  No results found for this or any previous visit (from the past 24 hour(s)). Lab Results   Component Value Date/Time    Valproic acid 82 11/17/2021 05:46 AM     No results found for: LITHM   RADIOLOGY REPORTS:(reviewed/updated 11/18/2021)  No results found. MEDICATIONS     ALL MEDICATIONS:   Current Facility-Administered Medications   Medication Dose Route Frequency    [START ON 11/19/2021] ARIPiprazole lauroxil (ARISTADA INITIO) 675 mg/2.4 mL ER injection 675 mg  675 mg IntraMUSCular ONCE    [START ON 11/19/2021] ARIPiprazole lauroxil (ARISTADA) 1,064 mg/3.9 mL ER injection 1,064 mg  1,064 mg IntraMUSCular Q 2 MONTHS    [START ON 11/19/2021] ARIPiprazole (ABILIFY) 1 mg/mL oral soln 30 mg  30 mg Oral ONCE    Or    [START ON 11/19/2021] haloperidol lactate (HALDOL) injection 5 mg +++COURT ORDERED MEDICATION FOR REFUSAL OF ORAL ARIPIPRAZOLE+++  5 mg IntraMUSCular ONCE    valproic acid (as sodium salt) (DEPAKENE) 250 mg/5 mL (5 mL) oral solution 750 mg  750 mg Oral Q12H    Or    LORazepam (ATIVAN) injection 1 mg +++COURT ORDERED MEDICATION FOR REFUSAL OF VALPROIC ACID+++  1 mg IntraMUSCular Q12H    OLANZapine (ZyPREXA) tablet 5 mg  5 mg Oral Q6H PRN    haloperidol lactate (HALDOL) injection 5 mg  5 mg IntraMUSCular Q6H PRN    benztropine (COGENTIN) tablet 1 mg  1 mg Oral BID PRN    diphenhydrAMINE (BENADRYL) injection 50 mg  50 mg IntraMUSCular BID PRN    hydrOXYzine HCL (ATARAX) tablet 50 mg  50 mg Oral TID PRN    LORazepam (ATIVAN) injection 1 mg  1 mg IntraMUSCular Q4H PRN    traZODone (DESYREL) tablet 50 mg  50 mg Oral QHS PRN    acetaminophen (TYLENOL) tablet 650 mg  650 mg Oral Q4H PRN    magnesium hydroxide (MILK OF MAGNESIA) 400 mg/5 mL oral suspension 30 mL  30 mL Oral DAILY PRN      SCHEDULED MEDICATIONS:   Current Facility-Administered Medications   Medication Dose Route Frequency    [START ON 11/19/2021] ARIPiprazole lauroxil (ARISTADA INITIO) 675 mg/2.4 mL ER injection 675 mg  675 mg IntraMUSCular ONCE    [START ON 11/19/2021] ARIPiprazole lauroxil (ARISTADA) 1,064 mg/3.9 mL ER injection 1,064 mg  1,064 mg IntraMUSCular Q 2 MONTHS    [START ON 11/19/2021] ARIPiprazole (ABILIFY) 1 mg/mL oral soln 30 mg  30 mg Oral ONCE    Or    [START ON 11/19/2021] haloperidol lactate (HALDOL) injection 5 mg +++COURT ORDERED MEDICATION FOR REFUSAL OF ORAL ARIPIPRAZOLE+++  5 mg IntraMUSCular ONCE    valproic acid (as sodium salt) (DEPAKENE) 250 mg/5 mL (5 mL) oral solution 750 mg  750 mg Oral Q12H    Or    LORazepam (ATIVAN) injection 1 mg +++COURT ORDERED MEDICATION FOR REFUSAL OF VALPROIC ACID+++  1 mg IntraMUSCular Q12H          ASSESSMENT & PLAN     DIAGNOSES REQUIRING ACTIVE TREATMENT AND MONITORING: (reviewed/updated 11/18/2021)  Patient Active Hospital Problem List:   Assessment: patient with decompensation in the field secondary to medication non-compliance. He is significantly impaired and will require stabilization with typical eliseo agents. Plan:  COURT ORDERED MEDICATIONS:    Aristada CHOU 1064 mg [id number] for psychosis CHOU   Depakote oral soln 750 mg *OR* Ativan 1 mg Q12H for eliseo   VPA level 82 on 11/17    - IGM therapy as tolerated  - Expand database / obtain collateral  - Dispo planning (home when stable)     I will continue to monitor blood levels (valproic acid---a drug with a narrow therapeutic index= NTI) and associated labs for drug therapy implemented that require intense monitoring for toxicity as deemed appropriate based on current medication side effects and pharmacodynamically determined drug 1/2 lives. In summary, Femi Solo, is a 27 y.o.  male who presents with a severe exacerbation of the principal diagnosis of Bipolar disorder, current episode manic severe with psychotic features ([facility] [address]    Patient's condition is improving. Patient requires continued inpatient hospitalization for further stabilization, safety monitoring and medication management. I will continue to coordinate the provision of individual, milieu, occupational, group, and substance abuse therapies to address target symptoms/diagnoses as deemed appropriate for the individual patient.   A coordinated, multidisplinary treatment team round was conducted with the patient (this team consists of the nurse, psychiatric unit pharmacist,  and writer). Complete current electronic health record for patient has been reviewed today including consultant notes, ancillary staff notes, nurses and psychiatric tech notes. Suicide risk assessment completed and patient deemed to be of low risk for suicide at this time. The following regarding medications was addressed during rounds with patient:   the risks and benefits of the proposed medication. The patient was given the opportunity to ask questions. Informed consent given to the use of the above medications. Will continue to adjust psychiatric and non-psychiatric medications (see above \"medication\" section and orders section for details) as deemed appropriate & based upon diagnoses and response to treatment. I will continue to order blood tests/labs and diagnostic tests as deemed appropriate and review results as they become available (see orders for details and above listed lab/test results). I will order psychiatric records from previous [facility] hospitals to further elucidate the nature of patient's psychopathology and review once available. I will gather additional collateral information from friends, family and o/p treatment team to further elucidate the nature of patient's psychopathology and baselline level of psychiatric functioning. I certify that this patient's inpatient psychiatric hospital services furnished since the previous certification were, and continue to be, required for treatment that could reasonably be expected to improve the patient's condition, or for diagnostic study, and that the patient continues to need, on a daily basis, active treatment furnished directly by or requiring the supervision of inpatient psychiatric facility personnel.  In addition, the hospital records show that services furnished were intensive treatment services, admission or related services, or equivalent services.     EXPECTED DISCHARGE DATE/DAY: TBD     DISPOSITION: Home       Signed By:   Jose Matos MD  11/18/2021

## 2021-11-18 NOTE — GROUP NOTE
ESRA  GROUP DOCUMENTATION INDIVIDUAL                                                                          Group Therapy Note    Date: 11/18/2021    Group Start Time: 1400  Group End Time: 1500  Group Topic: Recreational/Music Therapy    [facility] - [place] [address] ACUTE BEHAV [facility]    [place], [address] [facility] GROUP DOCUMENTATION GROUP    Group Therapy Note    Attendees: 6         Attendance: Attended    Patient's Goal:  To concentrate on selected task    Interventions/techniques: Supported-crafts,games,music    Follows Directions: Followed directions    Interactions: Disorganized interaction    Mental Status: Calm    Behavior/appearance: Attentive, Cooperative and Needed prompting    Goals Achieved:  Attended group session      Additional Notes:  Pleasant/cooperative,listened to selected music-disorganized thoughts    Xiomara Guthrie

## 2021-11-19 PROCEDURE — 90785 PSYTX COMPLEX INTERACTIVE: CPT | Performed by: PSYCHIATRY & NEUROLOGY

## 2021-11-19 PROCEDURE — 90836 PSYTX W PT W E/M 45 MIN: CPT | Performed by: PSYCHIATRY & NEUROLOGY

## 2021-11-19 PROCEDURE — 74011250636 HC RX REV CODE- 250/636: Performed by: PSYCHIATRY & NEUROLOGY

## 2021-11-19 PROCEDURE — 99231 SBSQ HOSP IP/OBS SF/LOW 25: CPT | Performed by: PSYCHIATRY & NEUROLOGY

## 2021-11-19 PROCEDURE — 74011250637 HC RX REV CODE- 250/637: Performed by: PSYCHIATRY & NEUROLOGY

## 2021-11-19 PROCEDURE — 65220000003 HC RM SEMIPRIVATE PSYCH

## 2021-11-19 RX ADMIN — ARIPIPRAZOLE LAUROXIL 1064 MG: 1064 INJECTION, SUSPENSION, EXTENDED RELEASE INTRAMUSCULAR at 10:37

## 2021-11-19 RX ADMIN — ARIPIPRAZOLE 30 MG: 1 SOLUTION ORAL at 08:50

## 2021-11-19 RX ADMIN — VALPROIC ACID 750 MG: 250 SOLUTION ORAL at 08:51

## 2021-11-19 RX ADMIN — ARIPIPRAZOLE LAUROXIL 675 MG: 675 INJECTION, SUSPENSION, EXTENDED RELEASE INTRAMUSCULAR at 10:37

## 2021-11-19 RX ADMIN — VALPROIC ACID 750 MG: 250 SOLUTION ORAL at 21:29

## 2021-11-19 NOTE — BH NOTES
GROUP THERAPY PROGRESS NOTE    Patient did not participate in Discharge Planning Group.      Erasmo Burks LPC [facility]

## 2021-11-19 NOTE — PROGRESS NOTES
Patient received walking in the simmons. Denies SI/HI/AVH, depression and anxiety. Cooperative, guarded, restless, pacing in the simmons throughout the evening, no interactions with his peers, minimal interactions with staff. Taking medications as prescribed. Will continue to monitor for safety. Patient appeared to sleep 2.75 hours.

## 2021-11-19 NOTE — PROGRESS NOTES
Assumed care of pt pacing in hallway. No distress noted. Med and meal compliant. Pt denies si/hi/avh and pain. Pt denies depression and anxiety. Mood is sad with flat affect. Pt provided items for showering. 1030: administered CHOU without incident        Problem: Psychosis  Goal: *STG/LTG: Complies with medication therapy  Outcome: Progressing Towards Goal     Problem: Falls - Risk of  Goal: *Absence of Falls  Description: Document Bailey Fall Risk and appropriate interventions in the flowsheet.   Outcome: Progressing Towards Goal  Note: Fall Risk Interventions:            Medication Interventions: Teach patient to arise slowly

## 2021-11-19 NOTE — BH NOTES
Behavioral Health Treatment Team Note      Patient goal(s) for today: continue taking medication as prescribed, engage in unit activities, participate in hygiene/ADLs, follow directions from staff, contact support team, prepare for discharge  Treatment team focus/goals: medication adjustments, dispo planning, update support system  Progress note: \"I've had a rough few days. \" Pt was seen in treatment team this morning. Pt is alert and oriented. Pt denies SI/HI. Pt's mood is WNL, affect is flat. Pt's thought process is illogical with delusional themes. Pt's insight and judgment is poor, reliability is poor. Pt participated in family meeting with mother and brother. Social work department will continue to coordinate discharge plans.      LOS:  10                       Expected LOS: TBD     Financial concerns/prescription coverage:  Medicaid   Date of last family contact:  Pt is refusing to sign DINO for family.                     Family requesting physician contact today:  No  Discharge plan: TBD  Guns in the home: None reported.                                                       Outpatient provider(s): To be linked.

## 2021-11-19 NOTE — BH NOTES
GROUP THERAPY PROGRESS NOTE    Patient did not participate in Coping Skills group.      Sharon Calderon LPC LSATP [facility]

## 2021-11-19 NOTE — GROUP NOTE
SERA  GROUP DOCUMENTATION INDIVIDUAL                                                                          Group Therapy Note    Date: 11/19/2021    Group Start Time: 0900  Group End Time: 1000  Group Topic: Topic Group    [facility] - [place] [address] ACUTE BEHAV [facility]    [place] [facility] GROUP DOCUMENTATION GROUP    Group Therapy Note    Attendees: 8         Attendance: Did not attend    Patient's Goal:      Interventions/techniques:Nyasia Brush

## 2021-11-19 NOTE — BH NOTES
PSYCHIATRIC PROGRESS NOTE         Patient Name  Hakeem Pearson   Date of Birth 1990   [facility] [id number]   Medical Record Number  [id number]      Age  27 y.o. PCP None   Admit date:  11/9/2021    Room Number  691/74  @ [facility]   Date of Service  11/19/2021         E & M PROGRESS NOTE:         HISTORY       CC:  \"psychosis\"  HISTORY OF PRESENT ILLNESS/INTERVAL HISTORY:  (reviewed/updated 11/19/2021). per initial evaluation: The patient, Hakeem Pearson, is a 27 y.o. BLACK/ male with a past psychiatric history significant for schizoaffective vs bipolar disorder, who presents at this time with complaints of (and/or evidence of) the following emotional symptoms: psychotic behavior and eliseo. Additional symptomatology include poor self care. The above symptoms have been present for 2+ weeks. These symptoms are of moderate to high severity. These symptoms are constant in nature. The patient's condition has been precipitated by psychosocial stressors. Patient's condition made worse by treatment noncompliance. UDS: negative; BAL=0.     Per admission documentation, the patient was picked up by police following suspicious car report; he had run out of gas en route to [place] and was unable to fully explain his situation. Patient has been guarded and uncooperative on the unit, pacing the hallway and responding to internal stimuli. Patient grossly incoherent on interview, dismissive and suspicious.       The patient is a poor and uncooperative historian. The patient corroborates the above narrative. The patient contracts for safety on the unit and gives consent for the team to contact collateral. The patient is not amenable to initiating treatment while on the unit. Patient states he ran out of gas and offers little information regarding how he came to be in [place] or why he was traveling to [place] from his native [address] [place] [address] -- he states he lives in [place] though this is not corroborated.  Patient ends interview abruptly and continues to pace the unit. Per collateral, patient has not been compliant with medications in the past and has had prior episodes of psychosis. 11/11 - the patient slept 4 hours overnight. He remains labile and disorganized, with inappropriate actions such as masturbating at he nurses' station. Patient refused medications and labwork, still pacing the unit with an odd affect. No PRNs given as patient is verbally redirectable, no physical aggression. Court order pending for 11/12 for forced medication during TDO hearing. 11/12 - patient remains uncooperative with treatment; he is bizarre and illogical on interview, still stating his family is dead. He slept 6.5 hours, refused all assessments, vitals and medications. Patient irritable, wants to leave the hospital today. Per family, he has the keys to his mother's car and will not return them (per SW, the keys cannot be taken from the hospital unless he releases his personal property to them). 11/15 - no significant improvements noted. Patient slept 5 hours overnight, he remains disorganized, guarded, states his family is 'dead' and refuses all assessments. Patient got no PRNs for agitation as verbal redirection has been successful. Patient discharge focused, unable to provide any meaningful information about his admission but has been refusing the team's offer to contact his family for further treatment planning. Patient compliant with court ordered medication. 11/16 - no acute overnight events. Patient slept 7.75 hours overnight. He is compliant with court ordered medication and denies SI/HI/AVH; patient remains guarded and suspicious about staff and will not speak with his family despite encouragement from team. He is isolative to room today and discharge focused. No agitation PRNs given. 11/17 - the patient has been isolative, disorganized but coherent, refusing vital signs but compliant with court ordered medication.  He is less internally preoccupied, discharge focused, continues to refuse any communication with his family, whom he states do not exist or have  previously. Of note, the patient lists his mother's name as 'Kamari,' and states he last saw her in , then 0. He is unable to reality test, repeatedly states he will be continuing on to [place] to meet his gf. VPA level 82.     - patient still pacing the hallway overnight but has been more cooperative this morning and willing to have team reach out to his family. Patient calm, took court ordered medications but remains irritable, discharge focused. Per family collateral, patient is still far from his baseline and this is his 4th episode this year. Family session for tomorrow to go over goals of care.  - overnight, the patient slept 2 hours. He is pacing but verbally redirectable. Compliant with PO medications. Patient discharge focused, he denies SI/PI/AVH/PI but remains disorganized. Family mtg this afternoon to discuss goals of care. SIDE EFFECTS: (reviewed/updated 2021)  None reported or admitted to. ALLERGIES:(reviewed/updated 2021)  No Known Allergies   MEDICATIONS PRIOR TO ADMISSION:(reviewed/updated 2021)  No medications prior to admission. PAST MEDICAL HISTORY: Past medical history from the initial psychiatric evaluation has been reviewed (reviewed/updated 2021) with no additional updates (I asked patient and no additional past medical history provided). History reviewed. No pertinent past medical history. History reviewed. No pertinent surgical history. SOCIAL HISTORY: Social history from the initial psychiatric evaluation has been reviewed (reviewed/updated 2021) with no additional updates (I asked patient and no additional social history provided).    Social History     Socioeconomic History    Marital status: SINGLE     Spouse name: Not on file    Number of children: Not on file    Years of education: Not on file    Highest education level: Not on file   Occupational History    Not on file   Tobacco Use    Smoking status: Current Some Day Smoker    Smokeless tobacco: Not on file   Substance and Sexual Activity    Alcohol use: Not Currently    Drug use: Not Currently    Sexual activity: Not Currently   Other Topics Concern    Not on file   Social History Narrative    Not on file     Social Determinants of Health     Financial Resource Strain:     Difficulty of Paying Living Expenses: Not on file   Food Insecurity:     Worried About Running Out of Food in the Last Year: Not on file    Ana of Food in the Last Year: Not on file   Transportation Needs:     Lack of Transportation (Medical): Not on file    Lack of Transportation (Non-Medical): Not on file   Physical Activity:     Days of Exercise per Week: Not on file    Minutes of Exercise per Session: Not on file   Stress:     Feeling of Stress : Not on file   Social Connections:     Frequency of Communication with Friends and Family: Not on file    Frequency of Social Gatherings with Friends and Family: Not on file    Attends Yarsanism Services: Not on file    Active Member of [address] [place] [address] or Organizations: Not on file    Attends Club or Organization Meetings: Not on file    Marital Status: Not on file   Intimate Partner Violence:     Fear of Current or Ex-Partner: Not on file    Emotionally Abused: Not on file    Physically Abused: Not on file    Sexually Abused: Not on file   Housing Stability:     Unable to Pay for Housing in the Last Year: Not on file    Number of [place] in the Last Year: Not on file    Unstable Housing in the Last Year: Not on file      FAMILY HISTORY: Family history from the initial psychiatric evaluation has been reviewed (reviewed/updated 11/19/2021) with no additional updates (I asked patient and no additional family history provided). History reviewed. No pertinent family history.     REVIEW OF SYSTEMS: (reviewed/updated 11/19/2021)  Appetite:no change from normal   Sleep: poor with DIMS (difficulty initiating & maintaining sleep)   All other Review of Systems: Negative except per HPI         [address] (MSE):    MSE FINDINGS ARE WITHIN NORMAL LIMITS (WNL) UNLESS OTHERWISE STATED BELOW. ( ALL OF THE BELOW CATEGORIES OF THE MSE HAVE BEEN REVIEWED (reviewed 11/19/2021) AND UPDATED AS DEEMED APPROPRIATE )  General Presentation age appropriate, guarded and uncooperative   Orientation disorganized, oriented to time, place and person   Vital Signs  See below (reviewed 11/19/2021); Vital Signs (BP, Pulse, & Temp) are within normal limits if not listed below. Gait and Station Stable/steady, no ataxia   Musculoskeletal System No extrapyramidal symptoms (EPS); no abnormal muscular movements or Tardive Dyskinesia (TD); muscle strength and tone are within normal limits   Language No aphasia or dysarthria   Speech:  hypoverbal and increased latency of response   Thought Processes illogical; normal rate of thoughts; poor abstract reasoning/computation   Thought Associations loose associations   Thought Content preoccupations and internally preoccupied   Suicidal Ideations none   Homicidal Ideations none   Mood:  hostile  and irritable   Affect:  constricted and increased in intensity   Memory recent  fair   Memory remote:  intact   Concentration/Attention:  intact   Fund of Knowledge average   Insight:  poor   Reliability fair   Judgment:  poor          VITALS:     No data found.   Wt Readings from Last 3 Encounters:   11/10/21 90.7 kg (200 lb)     Temp Readings from Last 3 Encounters:   No data found for Temp     BP Readings from Last 3 Encounters:   No data found for BP     Pulse Readings from Last 3 Encounters:   No data found for Pulse            DATA     LABORATORY DATA:(reviewed/updated 11/19/2021)  No results found for this or any previous visit (from the past 24 hour(s)). Lab Results   Component Value Date/Time    Valproic acid 82 11/17/2021 05:46 AM     No results found for: LITH   RADIOLOGY REPORTS:(reviewed/updated 11/19/2021)  No results found.        MEDICATIONS     ALL MEDICATIONS:   Current Facility-Administered Medications   Medication Dose Route Frequency    ARIPiprazole lauroxil (ARISTADA) 1,064 mg/3.9 mL ER injection 1,064 mg  1,064 mg IntraMUSCular Q 2 MONTHS    valproic acid (as sodium salt) (DEPAKENE) 250 mg/5 mL (5 mL) oral solution 750 mg  750 mg Oral Q12H    Or    LORazepam (ATIVAN) injection 1 mg +++COURT ORDERED MEDICATION FOR REFUSAL OF VALPROIC ACID+++  1 mg IntraMUSCular Q12H    OLANZapine (ZyPREXA) tablet 5 mg  5 mg Oral Q6H PRN    haloperidol lactate (HALDOL) injection 5 mg  5 mg IntraMUSCular Q6H PRN    benztropine (COGENTIN) tablet 1 mg  1 mg Oral BID PRN    diphenhydrAMINE (BENADRYL) injection 50 mg  50 mg IntraMUSCular BID PRN    hydrOXYzine HCL (ATARAX) tablet 50 mg  50 mg Oral TID PRN    LORazepam (ATIVAN) injection 1 mg  1 mg IntraMUSCular Q4H PRN    traZODone (DESYREL) tablet 50 mg  50 mg Oral QHS PRN    acetaminophen (TYLENOL) tablet 650 mg  650 mg Oral Q4H PRN    magnesium hydroxide (MILK OF MAGNESIA) 400 mg/5 mL oral suspension 30 mL  30 mL Oral DAILY PRN      SCHEDULED MEDICATIONS:   Current Facility-Administered Medications   Medication Dose Route Frequency    ARIPiprazole lauroxil (ARISTADA) 1,064 mg/3.9 mL ER injection 1,064 mg  1,064 mg IntraMUSCular Q 2 MONTHS    valproic acid (as sodium salt) (DEPAKENE) 250 mg/5 mL (5 mL) oral solution 750 mg  750 mg Oral Q12H    Or    LORazepam (ATIVAN) injection 1 mg +++COURT ORDERED MEDICATION FOR REFUSAL OF VALPROIC ACID+++  1 mg IntraMUSCular Q12H          ASSESSMENT & PLAN     DIAGNOSES REQUIRING ACTIVE TREATMENT AND MONITORING: (reviewed/updated 11/19/2021)  Patient Active Hospital Problem List:   Assessment: patient with decompensation in the field secondary to medication non-compliance. He is significantly impaired and will require stabilization with typical eliseo agents. Plan:  COURT ORDERED MEDICATIONS:    Aristada CHOU 1064 mg [id number] for psychosis CHOU   Depakote oral soln 750 mg *OR* Ativan 1 mg Q12H for eliseo   VPA level 82 on 11/17    - IGM therapy as tolerated  - Expand database / obtain collateral  - Dispo planning (home when stable)     I will continue to monitor blood levels (valproic acid---a drug with a narrow therapeutic index= NTI) and associated labs for drug therapy implemented that require intense monitoring for toxicity as deemed appropriate based on current medication side effects and pharmacodynamically determined drug 1/2 lives. In summary, Hakeem Pearson, is a 27 y.o.  male who presents with a severe exacerbation of the principal diagnosis of Bipolar disorder, current episode manic severe with psychotic features ([facility] [address]    Patient's condition is improving. Patient requires continued inpatient hospitalization for further stabilization, safety monitoring and medication management. I will continue to coordinate the provision of individual, milieu, occupational, group, and substance abuse therapies to address target symptoms/diagnoses as deemed appropriate for the individual patient. A coordinated, multidisplinary treatment team round was conducted with the patient (this team consists of the nurse, psychiatric unit pharmacist,  and writer). Complete current electronic health record for patient has been reviewed today including consultant notes, ancillary staff notes, nurses and psychiatric tech notes. Suicide risk assessment completed and patient deemed to be of low risk for suicide at this time. The following regarding medications was addressed during rounds with patient:   the risks and benefits of the proposed medication. The patient was given the opportunity to ask questions.  Informed consent given to the use of the above medications. Will continue to adjust psychiatric and non-psychiatric medications (see above \"medication\" section and orders section for details) as deemed appropriate & based upon diagnoses and response to treatment. I will continue to order blood tests/labs and diagnostic tests as deemed appropriate and review results as they become available (see orders for details and above listed lab/test results). I will order psychiatric records from previous [facility] hospitals to further elucidate the nature of patient's psychopathology and review once available. I will gather additional collateral information from friends, family and o/p treatment team to further elucidate the nature of patient's psychopathology and baselline level of psychiatric functioning. I certify that this patient's inpatient psychiatric hospital services furnished since the previous certification were, and continue to be, required for treatment that could reasonably be expected to improve the patient's condition, or for diagnostic study, and that the patient continues to need, on a daily basis, active treatment furnished directly by or requiring the supervision of inpatient psychiatric facility personnel. In addition, the hospital records show that services furnished were intensive treatment services, admission or related services, or equivalent services.     EXPECTED DISCHARGE DATE/DAY: TBD     DISPOSITION: Home       Signed By:   Shruthi Farrell MD  11/19/2021

## 2021-11-19 NOTE — GROUP NOTE
SERA  GROUP DOCUMENTATION INDIVIDUAL                                                                          Group Therapy Note    Date: 11/19/2021    Group Start Time: 1100  Group End Time: 1200  Group Topic: Topic Group    [facility] 3 ACUTE BEHAV [facility]    [place], [address] GROUP    Group Therapy Note    Attendees: 6         Attendance: Did not attend    Patient's Goal:      Interventions/techniques:Nyasia Brush

## 2021-11-19 NOTE — GROUP NOTE
SERA  GROUP DOCUMENTATION INDIVIDUAL                                                                          Group Therapy Note    Date: 11/19/2021    Group Start Time: 1500  Group End Time: 1600  Group Topic: Recreational/Music Therapy    [address] [facility] 3 ACUTE BEHAV [facility]    Buddy Puentes [facility] GROUP    Group Therapy Note    Attendees: 7         Attendance: Did not attend    Patient's Goal:      Interventions/techniquesNyasia Brush

## 2021-11-19 NOTE — PROGRESS NOTES
Problem: Psychosis  Goal: *STG: Remains safe in hospital  Outcome: Progressing Towards Goal  Goal: *STG/LTG: Complies with medication therapy  Outcome: Progressing Towards Goal  Goal: *STG/LTG: Demonstrates improved social functioning by responding appropriately to staff  Outcome: Progressing Towards Goal     Problem: Anxiety  Goal: *Alleviation of anxiety  Outcome: Progressing Towards Goal     Problem: Falls - Risk of  Goal: *Absence of Falls  Description: Document Hannah Chavez Fall Risk and appropriate interventions in the flowsheet.   Outcome: Progressing Towards Goal  Note: Fall Risk Interventions:     Medication Interventions: Teach patient to arise slowly

## 2021-11-20 PROCEDURE — 74011250637 HC RX REV CODE- 250/637: Performed by: PSYCHIATRY & NEUROLOGY

## 2021-11-20 PROCEDURE — 65220000003 HC RM SEMIPRIVATE PSYCH

## 2021-11-20 RX ADMIN — VALPROIC ACID 750 MG: 250 SOLUTION ORAL at 21:45

## 2021-11-20 RX ADMIN — VALPROIC ACID 750 MG: 250 SOLUTION ORAL at 08:08

## 2021-11-20 NOTE — PROGRESS NOTES
Patient was complaint with medication administration. Patient had no behavioral issues. Patient denied SI/HI/AVH, no DTS/DTO behaviors. Patient remains guarded and withdrawn. No signs of acute distress noted. Will continue to monitor.

## 2021-11-20 NOTE — PROGRESS NOTES
Problem: Psychosis  Goal: *STG: Decreased hallucinations  Outcome: Progressing Towards Goal  Goal: *STG: Remains safe in hospital  Outcome: Progressing Towards Goal    0730 Pt received in room. Pt denies si/hi/ah/vh. Pt reports going to groups and eating outside in the dayroom. Anxiety=0/10 depression= 0/10. Pt is Aox4. Pt is calm and cooperative. Med and meal compliant. Pain level of 0/10 . Will continue to monitor for any changes.        Please see MAR for PRN medication administration

## 2021-11-21 PROCEDURE — 65220000003 HC RM SEMIPRIVATE PSYCH

## 2021-11-21 PROCEDURE — 74011250637 HC RX REV CODE- 250/637: Performed by: PSYCHIATRY & NEUROLOGY

## 2021-11-21 RX ORDER — TRAZODONE HYDROCHLORIDE 100 MG/1
100 TABLET ORAL
Status: DISCONTINUED | OUTPATIENT
Start: 2021-11-21 | End: 2021-11-22

## 2021-11-21 RX ADMIN — VALPROIC ACID 750 MG: 250 SOLUTION ORAL at 21:14

## 2021-11-21 RX ADMIN — VALPROIC ACID 750 MG: 250 SOLUTION ORAL at 08:20

## 2021-11-21 NOTE — BH NOTES
GROUP THERAPY PROGRESS NOTE    Patient is participating in Recreational Therapy/Coping Skills Group. Group time: 60 minutes      Personal goal for participation: To concentrate on selected task; positively engage in interactions with others; actively listen to others; self-disclose thoughts, emotions and behaviors; and discuss coping skills. Goal orientation: Personal    Group therapy participation: active    Therapeutic interventions reviewed and discussed: Group members participated in an art and music recreational therapy activity. Patients were able to actively participate in activity while engaging in conversation with staff and other patients. Patients appropriately socialized and processed presenting problems and coping skills while engaging in activity. Impression of participation: Pt oddly related, disorganized, interacted minimally with others. Pt listened to music, stated he was \"in the zone. \" Pt remains food motivated. Pt able to follow directions of staff.     TAMY Sky

## 2021-11-21 NOTE — BH NOTES
PSYCHIATRIC PROGRESS NOTE         Patient Name  Connie Miller   Date of Birth 1990   [facility] [id number]   Medical Record Number  [id number]      Age  27 y.o. PCP None   Admit date:  11/9/2021    Room Number  834/58  @ [facility]   Date of Service  11/21/2021         E & M PROGRESS NOTE:         HISTORY       CC:  \"psychosis\"  HISTORY OF PRESENT ILLNESS/INTERVAL HISTORY:  (reviewed/updated 11/21/2021). per initial evaluation: The patient, Connie Miller, is a 27 y.o. BLACK/ male with a past psychiatric history significant for schizoaffective vs bipolar disorder, who presents at this time with complaints of (and/or evidence of) the following emotional symptoms: psychotic behavior and eliseo. Additional symptomatology include poor self care. The above symptoms have been present for 2+ weeks. These symptoms are of moderate to high severity. These symptoms are constant in nature. The patient's condition has been precipitated by psychosocial stressors. Patient's condition made worse by treatment noncompliance. UDS: negative; BAL=0.     Per admission documentation, the patient was picked up by police following suspicious car report; he had run out of gas en route to [place] and was unable to fully explain his situation. Patient has been guarded and uncooperative on the unit, pacing the hallway and responding to internal stimuli. Patient grossly incoherent on interview, dismissive and suspicious.       The patient is a poor and uncooperative historian. The patient corroborates the above narrative. The patient contracts for safety on the unit and gives consent for the team to contact collateral. The patient is not amenable to initiating treatment while on the unit. Patient states he ran out of gas and offers little information regarding how he came to be in [place] or why he was traveling to [place] from his native [place] -- he states he lives in [place] though this is not corroborated.  Patient ends interview abruptly and continues to pace the unit. Per collateral, patient has not been compliant with medications in the past and has had prior episodes of psychosis. 11/11 - the patient slept 4 hours overnight. He remains labile and disorganized, with inappropriate actions such as masturbating at he nurses' station. Patient refused medications and labwork, still pacing the unit with an odd affect. No PRNs given as patient is verbally redirectable, no physical aggression. Court order pending for 11/12 for forced medication during TDO hearing. 11/12 - patient remains uncooperative with treatment; he is bizarre and illogical on interview, still stating his family is dead. He slept 6.5 hours, refused all assessments, vitals and medications. Patient irritable, wants to leave the hospital today. Per family, he has the keys to his mother's car and will not return them (per SW, the keys cannot be taken from the hospital unless he releases his personal property to them). 11/15 - no significant improvements noted. Patient slept 5 hours overnight, he remains disorganized, guarded, states his family is 'dead' and refuses all assessments. Patient got no PRNs for agitation as verbal redirection has been successful. Patient discharge focused, unable to provide any meaningful information about his admission but has been refusing the team's offer to contact his family for further treatment planning. Patient compliant with court ordered medication. 11/16 - no acute overnight events. Patient slept 7.75 hours overnight. He is compliant with court ordered medication and denies SI/HI/AVH; patient remains guarded and suspicious about staff and will not speak with his family despite encouragement from team. He is isolative to room today and discharge focused. No agitation PRNs given. 11/17 - the patient has been isolative, disorganized but coherent, refusing vital signs but compliant with court ordered medication.  He is less internally preoccupied, discharge focused, continues to refuse any communication with his family, whom he states do not exist or have  previously. Of note, the patient lists his mother's name as 'Kamari,' and states he last saw her in , then 0. He is unable to reality test, repeatedly states he will be continuing on to [place] to meet his gf. VPA level 82.     - patient still pacing the hallway overnight but has been more cooperative this morning and willing to have team reach out to his family. Patient calm, took court ordered medications but remains irritable, discharge focused. Per family collateral, patient is still far from his baseline and this is his 4th episode this year. Family session for tomorrow to go over goals of care.  - overnight, the patient slept 2 hours. He is pacing but verbally redirectable. Compliant with PO medications. Patient discharge focused, he denies SI/PI/AVH/PI but remains disorganized. Family mtg this afternoon to discuss goals of care.  - Deneen Matthews reports feeling well and inquired when he could go home. Moods are good. Denies SI/HI/AH/VH. No aggression or violence. Quiet and isolative per staff. Appropriately interactive and aware. Tolerating medications well. Eating and sleeping poorly (4hrs). SIDE EFFECTS: (reviewed/updated 2021)  None reported or admitted to. ALLERGIES:(reviewed/updated 2021)  No Known Allergies   MEDICATIONS PRIOR TO ADMISSION:(reviewed/updated 2021)  No medications prior to admission. PAST MEDICAL HISTORY: Past medical history from the initial psychiatric evaluation has been reviewed (reviewed/updated 2021) with no additional updates (I asked patient and no additional past medical history provided). History reviewed. No pertinent past medical history. History reviewed. No pertinent surgical history.    SOCIAL HISTORY: Social history from the initial psychiatric evaluation has been reviewed (reviewed/updated 11/21/2021) with no additional updates (I asked patient and no additional social history provided). Social History     Socioeconomic History    Marital status: SINGLE     Spouse name: Not on file    Number of children: Not on file    Years of education: Not on file    Highest education level: Not on file   Occupational History    Not on file   Tobacco Use    Smoking status: Current Some Day Smoker    Smokeless tobacco: Not on file   Substance and Sexual Activity    Alcohol use: Not Currently    Drug use: Not Currently    Sexual activity: Not Currently   Other Topics Concern    Not on file   Social History Narrative    Not on file     Social Determinants of Health     Financial Resource Strain:     Difficulty of Paying Living Expenses: Not on file   Food Insecurity:     Worried About Running Out of Food in the Last Year: Not on file    Ana of Food in the Last Year: Not on file   Transportation Needs:     Lack of Transportation (Medical): Not on file    Lack of Transportation (Non-Medical):  Not on file   Physical Activity:     Days of Exercise per Week: Not on file    Minutes of Exercise per Session: Not on file   Stress:     Feeling of Stress : Not on file   Social Connections:     Frequency of Communication with Friends and Family: Not on file    Frequency of Social Gatherings with Friends and Family: Not on file    Attends Buddhist Services: Not on file    Active Member of [address] [place] [address] or Organizations: Not on file    Attends Club or Organization Meetings: Not on file    Marital Status: Not on file   Intimate Partner Violence:     Fear of Current or Ex-Partner: Not on file    Emotionally Abused: Not on file    Physically Abused: Not on file    Sexually Abused: Not on file   Housing Stability:     Unable to Pay for Housing in the Last Year: Not on file    Number of [place] in the Last Year: Not on file    Unstable Housing in the Last Year: Not on file      FAMILY HISTORY: Family history from the initial psychiatric evaluation has been reviewed (reviewed/updated 11/21/2021) with no additional updates (I asked patient and no additional family history provided). History reviewed. No pertinent family history. REVIEW OF SYSTEMS: (reviewed/updated 11/21/2021)  Appetite:no change from normal   Sleep: poor with DIMS (difficulty initiating & maintaining sleep)   All other Review of Systems: Negative except per HPI         [facility] (MSE):    MSE FINDINGS ARE WITHIN NORMAL LIMITS (WNL) UNLESS OTHERWISE STATED BELOW. ( ALL OF THE BELOW CATEGORIES OF THE MSE HAVE BEEN REVIEWED (reviewed 11/21/2021) AND UPDATED AS DEEMED APPROPRIATE )  General Presentation age appropriate, guarded and uncooperative   Orientation disorganized, oriented to time, place and person   Vital Signs  See below (reviewed 11/21/2021); Vital Signs (BP, Pulse, & Temp) are within normal limits if not listed below.    Gait and Station Stable/steady, no ataxia   Musculoskeletal System No extrapyramidal symptoms (EPS); no abnormal muscular movements or Tardive Dyskinesia (TD); muscle strength and tone are within normal limits   Language No aphasia or dysarthria   Speech:  hypoverbal and increased latency of response   Thought Processes illogical; normal rate of thoughts; poor abstract reasoning/computation   Thought Associations loose associations   Thought Content preoccupations and internally preoccupied   Suicidal Ideations none   Homicidal Ideations none   Mood:  hostile  and irritable   Affect:  constricted and increased in intensity   Memory recent  fair   Memory remote:  intact   Concentration/Attention:  intact   Fund of Knowledge average   Insight:  poor   Reliability fair   Judgment:  poor          VITALS:     Patient Vitals for the past 24 hrs:   Temp Pulse Resp BP SpO2   11/20/21 2147 97.3 °F (36.3 °C) 77 16 125/74 97 %   11/20/21 0825 98.5 °F (36.9 °C) 69 18 (!) 154/88 95 %     Wt Readings from Last 3 Encounters:   11/10/21 90.7 kg (200 lb)     Temp Readings from Last 3 Encounters:   11/20/21 97.3 °F (36.3 °C)     BP Readings from Last 3 Encounters:   11/20/21 125/74     Pulse Readings from Last 3 Encounters:   11/20/21 77            DATA     LABORATORY DATA:(reviewed/updated 11/21/2021)  No results found for this or any previous visit (from the past 24 hour(s)). Lab Results   Component Value Date/Time    Valproic acid 82 11/17/2021 05:46 AM     No results found for: LITHM   RADIOLOGY REPORTS:(reviewed/updated 11/21/2021)  No results found.        MEDICATIONS     ALL MEDICATIONS:   Current Facility-Administered Medications   Medication Dose Route Frequency    ARIPiprazole lauroxil (ARISTADA) 1,064 mg/3.9 mL ER injection 1,064 mg  1,064 mg IntraMUSCular Q 2 MONTHS    valproic acid (as sodium salt) (DEPAKENE) 250 mg/5 mL (5 mL) oral solution 750 mg  750 mg Oral Q12H    Or    LORazepam (ATIVAN) injection 1 mg +++COURT ORDERED MEDICATION FOR REFUSAL OF VALPROIC ACID+++  1 mg IntraMUSCular Q12H    OLANZapine (ZyPREXA) tablet 5 mg  5 mg Oral Q6H PRN    haloperidol lactate (HALDOL) injection 5 mg  5 mg IntraMUSCular Q6H PRN    benztropine (COGENTIN) tablet 1 mg  1 mg Oral BID PRN    diphenhydrAMINE (BENADRYL) injection 50 mg  50 mg IntraMUSCular BID PRN    hydrOXYzine HCL (ATARAX) tablet 50 mg  50 mg Oral TID PRN    LORazepam (ATIVAN) injection 1 mg  1 mg IntraMUSCular Q4H PRN    traZODone (DESYREL) tablet 50 mg  50 mg Oral QHS PRN    acetaminophen (TYLENOL) tablet 650 mg  650 mg Oral Q4H PRN    magnesium hydroxide (MILK OF MAGNESIA) 400 mg/5 mL oral suspension 30 mL  30 mL Oral DAILY PRN      SCHEDULED MEDICATIONS:   Current Facility-Administered Medications   Medication Dose Route Frequency    ARIPiprazole lauroxil (ARISTADA) 1,064 mg/3.9 mL ER injection 1,064 mg  1,064 mg IntraMUSCular Q 2 MONTHS    valproic acid (as sodium salt) (DEPAKENE) 250 mg/5 mL (5 mL) oral solution 750 mg  750 mg Oral Q12H    Or    LORazepam (ATIVAN) injection 1 mg +++COURT ORDERED MEDICATION FOR REFUSAL OF VALPROIC ACID+++  1 mg IntraMUSCular Q12H          ASSESSMENT & PLAN     DIAGNOSES REQUIRING ACTIVE TREATMENT AND MONITORING: (reviewed/updated 11/21/2021)  Patient Active Hospital Problem List:   Assessment: patient with decompensation in the field secondary to medication non-compliance. He is significantly impaired and will require stabilization with typical eliseo agents. Plan:  COURT ORDERED MEDICATIONS:    Aristada CHOU 1064 mg [id number] for psychosis COHU   Depakote oral soln 750 mg *OR* Ativan 1 mg Q12H for eliseo   VPA level 82 on 11/17    - IGM therapy as tolerated  - Expand database / obtain collateral  - Dispo planning (home when stable)     I will continue to monitor blood levels (valproic acid---a drug with a narrow therapeutic index= NTI) and associated labs for drug therapy implemented that require intense monitoring for toxicity as deemed appropriate based on current medication side effects and pharmacodynamically determined drug 1/2 lives. In summary, Gabriela Cota, is a 27 y.o.  male who presents with a severe exacerbation of the principal diagnosis of Bipolar disorder, current episode manic severe with psychotic features ([address]    Patient's condition is improving. Patient requires continued inpatient hospitalization for further stabilization, safety monitoring and medication management. I will continue to coordinate the provision of individual, milieu, occupational, group, and substance abuse therapies to address target symptoms/diagnoses as deemed appropriate for the individual patient. A coordinated, multidisplinary treatment team round was conducted with the patient (this team consists of the nurse, psychiatric unit pharmacist,  and writer).      Complete current electronic health record for patient has been reviewed today including consultant notes, ancillary staff notes, nurses and psychiatric tech notes. Suicide risk assessment completed and patient deemed to be of low risk for suicide at this time. The following regarding medications was addressed during rounds with patient:   the risks and benefits of the proposed medication. The patient was given the opportunity to ask questions. Informed consent given to the use of the above medications. Will continue to adjust psychiatric and non-psychiatric medications (see above \"medication\" section and orders section for details) as deemed appropriate & based upon diagnoses and response to treatment. I will continue to order blood tests/labs and diagnostic tests as deemed appropriate and review results as they become available (see orders for details and above listed lab/test results). I will order psychiatric records from previous [facility] hospitals to further elucidate the nature of patient's psychopathology and review once available. I will gather additional collateral information from friends, family and o/p treatment team to further elucidate the nature of patient's psychopathology and baselline level of psychiatric functioning. I certify that this patient's inpatient psychiatric hospital services furnished since the previous certification were, and continue to be, required for treatment that could reasonably be expected to improve the patient's condition, or for diagnostic study, and that the patient continues to need, on a daily basis, active treatment furnished directly by or requiring the supervision of inpatient psychiatric facility personnel. In addition, the hospital records show that services furnished were intensive treatment services, admission or related services, or equivalent services.     EXPECTED DISCHARGE DATE/DAY: TBD     DISPOSITION: Home       Signed By:   Gladys Almaraz MD  11/21/2021

## 2021-11-21 NOTE — BH NOTES
PSYCHIATRIC PROGRESS NOTE         Patient Name  Juan Renee   Date of Birth 1990   [facility] [id number]   Medical Record Number  [id number]      Age  27 y.o. PCP None   Admit date:  11/9/2021    Room Number  498/91  @ [facility]   Date of Service  11/21/2021         E & M PROGRESS NOTE:         HISTORY       CC:  \"psychosis\"  HISTORY OF PRESENT ILLNESS/INTERVAL HISTORY:  (reviewed/updated 11/21/2021). per initial evaluation: The patient, Juan Renee, is a 27 y.o. BLACK/ male with a past psychiatric history significant for schizoaffective vs bipolar disorder, who presents at this time with complaints of (and/or evidence of) the following emotional symptoms: psychotic behavior and eliseo. Additional symptomatology include poor self care. The above symptoms have been present for 2+ weeks. These symptoms are of moderate to high severity. These symptoms are constant in nature. The patient's condition has been precipitated by psychosocial stressors. Patient's condition made worse by treatment noncompliance. UDS: negative; BAL=0.     Per admission documentation, the patient was picked up by police following suspicious car report; he had run out of gas en route to [place] and was unable to fully explain his situation. Patient has been guarded and uncooperative on the unit, pacing the hallway and responding to internal stimuli. Patient grossly incoherent on interview, dismissive and suspicious.       The patient is a poor and uncooperative historian. The patient corroborates the above narrative. The patient contracts for safety on the unit and gives consent for the team to contact collateral. The patient is not amenable to initiating treatment while on the unit. Patient states he ran out of gas and offers little information regarding how he came to be in [facility] or why he was traveling to [place] from his native [place] -- he states he lives in [place] though this is not corroborated.  Patient ends interview abruptly and continues to pace the unit. Per collateral, patient has not been compliant with medications in the past and has had prior episodes of psychosis. 11/11 - the patient slept 4 hours overnight. He remains labile and disorganized, with inappropriate actions such as masturbating at he nurses' station. Patient refused medications and labwork, still pacing the unit with an odd affect. No PRNs given as patient is verbally redirectable, no physical aggression. Court order pending for 11/12 for forced medication during TDO hearing. 11/12 - patient remains uncooperative with treatment; he is bizarre and illogical on interview, still stating his family is dead. He slept 6.5 hours, refused all assessments, vitals and medications. Patient irritable, wants to leave the hospital today. Per family, he has the keys to his mother's car and will not return them (per SW, the keys cannot be taken from the hospital unless he releases his personal property to them). 11/15 - no significant improvements noted. Patient slept 5 hours overnight, he remains disorganized, guarded, states his family is 'dead' and refuses all assessments. Patient got no PRNs for agitation as verbal redirection has been successful. Patient discharge focused, unable to provide any meaningful information about his admission but has been refusing the team's offer to contact his family for further treatment planning. Patient compliant with court ordered medication. 11/16 - no acute overnight events. Patient slept 7.75 hours overnight. He is compliant with court ordered medication and denies SI/HI/AVH; patient remains guarded and suspicious about staff and will not speak with his family despite encouragement from team. He is isolative to room today and discharge focused. No agitation PRNs given. 11/17 - the patient has been isolative, disorganized but coherent, refusing vital signs but compliant with court ordered medication.  He is less internally preoccupied, discharge focused, continues to refuse any communication with his family, whom he states do not exist or have  previously. Of note, the patient lists his mother's name as 'Kamari,' and states he last saw her in , then 0. He is unable to reality test, repeatedly states he will be continuing on to [place] to meet his gf. VPA level 82.     - patient still pacing the hallway overnight but has been more cooperative this morning and willing to have team reach out to his family. Patient calm, took court ordered medications but remains irritable, discharge focused. Per family collateral, patient is still far from his baseline and this is his 4th episode this year. Family session for tomorrow to go over goals of care.  - overnight, the patient slept 2 hours. He is pacing but verbally redirectable. Compliant with PO medications. Patient discharge focused, he denies SI/PI/AVH/PI but remains disorganized. Family mtg this afternoon to discuss goals of care.  - Jerryevelioyamel Matthewroe reports feeling well and inquired when he could go home. Moods are good. Denies SI/HI/AH/VH. No aggression or violence. Quiet and isolative per staff. Appropriately interactive and aware. Tolerating medications well. Eating and sleeping poorly (4hrs).  - Hiwot Matthewroe reports feeling the same as yesterday. Moods are good. Denies SI/HI/AH/VH. No aggression or violence. Appropriately interactive and aware. Tolerating medications well. Eating and sleeping poorly at 4.5 hrs. SIDE EFFECTS: (reviewed/updated 2021)  None reported or admitted to. ALLERGIES:(reviewed/updated 2021)  No Known Allergies   MEDICATIONS PRIOR TO ADMISSION:(reviewed/updated 2021)  No medications prior to admission.       PAST MEDICAL HISTORY: Past medical history from the initial psychiatric evaluation has been reviewed (reviewed/updated 2021) with no additional updates (I asked patient and no additional past medical history provided). History reviewed. No pertinent past medical history. History reviewed. No pertinent surgical history. SOCIAL HISTORY: Social history from the initial psychiatric evaluation has been reviewed (reviewed/updated 11/21/2021) with no additional updates (I asked patient and no additional social history provided). Social History     Socioeconomic History    Marital status: SINGLE     Spouse name: Not on file    Number of children: Not on file    Years of education: Not on file    Highest education level: Not on file   Occupational History    Not on file   Tobacco Use    Smoking status: Current Some Day Smoker    Smokeless tobacco: Not on file   Substance and Sexual Activity    Alcohol use: Not Currently    Drug use: Not Currently    Sexual activity: Not Currently   Other Topics Concern    Not on file   Social History Narrative    Not on file     Social Determinants of Health     Financial Resource Strain:     Difficulty of Paying Living Expenses: Not on file   Food Insecurity:     Worried About Running Out of Food in the Last Year: Not on file    Ana of Food in the Last Year: Not on file   Transportation Needs:     Lack of Transportation (Medical): Not on file    Lack of Transportation (Non-Medical):  Not on file   Physical Activity:     Days of Exercise per Week: Not on file    Minutes of Exercise per Session: Not on file   Stress:     Feeling of Stress : Not on file   Social Connections:     Frequency of Communication with Friends and Family: Not on file    Frequency of Social Gatherings with Friends and Family: Not on file    Attends Yarsani Services: Not on file    Active Member of Clubs or Organizations: Not on file    Attends Club or Organization Meetings: Not on file    Marital Status: Not on file   Intimate Partner Violence:     Fear of Current or Ex-Partner: Not on file    Emotionally Abused: Not on file   [facility] Physically Abused: Not on file    Sexually Abused: Not on file   Housing Stability:     Unable to Pay for Housing in the Last Year: Not on file    Number of Places Lived in the Last Year: Not on file    Unstable Housing in the Last Year: Not on file      FAMILY HISTORY: Family history from the initial psychiatric evaluation has been reviewed (reviewed/updated 11/21/2021) with no additional updates (I asked patient and no additional family history provided). History reviewed. No pertinent family history. REVIEW OF SYSTEMS: (reviewed/updated 11/21/2021)  Appetite:no change from normal   Sleep: poor with DIMS (difficulty initiating & maintaining sleep)   All other Review of Systems: Negative except per HPI         [facility] (MSE):    MSE FINDINGS ARE WITHIN NORMAL LIMITS (WNL) UNLESS OTHERWISE STATED BELOW. ( ALL OF THE BELOW CATEGORIES OF THE MSE HAVE BEEN REVIEWED (reviewed 11/21/2021) AND UPDATED AS DEEMED APPROPRIATE )  General Presentation age appropriate, guarded and uncooperative   Orientation disorganized, oriented to time, place and person   Vital Signs  See below (reviewed 11/21/2021); Vital Signs (BP, Pulse, & Temp) are within normal limits if not listed below.    Gait and Station Stable/steady, no ataxia   Musculoskeletal System No extrapyramidal symptoms (EPS); no abnormal muscular movements or Tardive Dyskinesia (TD); muscle strength and tone are within normal limits   Language No aphasia or dysarthria   Speech:  hypoverbal and increased latency of response   Thought Processes illogical; normal rate of thoughts; poor abstract reasoning/computation   Thought Associations loose associations   Thought Content preoccupations and internally preoccupied   Suicidal Ideations none   Homicidal Ideations none   Mood:  hostile  and irritable   Affect:  constricted and increased in intensity   Memory recent  fair   Memory remote:  intact   Concentration/Attention: intact   Fund of Knowledge average   Insight:  poor   Reliability fair   Judgment:  poor          VITALS:     Patient Vitals for the past 24 hrs:   Temp Pulse Resp BP SpO2   11/20/21 2147 97.3 °F (36.3 °C) 77 16 125/74 97 %     Wt Readings from Last 3 Encounters:   11/10/21 90.7 kg (200 lb)     Temp Readings from Last 3 Encounters:   11/20/21 97.3 °F (36.3 °C)     BP Readings from Last 3 Encounters:   11/20/21 125/74     Pulse Readings from Last 3 Encounters:   11/20/21 77            DATA     LABORATORY DATA:(reviewed/updated 11/21/2021)  No results found for this or any previous visit (from the past 24 hour(s)). Lab Results   Component Value Date/Time    Valproic acid 82 11/17/2021 05:46 AM     No results found for: PRECIOUS   RADIOLOGY REPORTS:(reviewed/updated 11/21/2021)  No results found.        MEDICATIONS     ALL MEDICATIONS:   Current Facility-Administered Medications   Medication Dose Route Frequency    ARIPiprazole lauroxil (ARISTADA) 1,064 mg/3.9 mL ER injection 1,064 mg  1,064 mg IntraMUSCular Q 2 MONTHS    valproic acid (as sodium salt) (DEPAKENE) 250 mg/5 mL (5 mL) oral solution 750 mg  750 mg Oral Q12H    Or    LORazepam (ATIVAN) injection 1 mg +++COURT ORDERED MEDICATION FOR REFUSAL OF VALPROIC ACID+++  1 mg IntraMUSCular Q12H    OLANZapine (ZyPREXA) tablet 5 mg  5 mg Oral Q6H PRN    haloperidol lactate (HALDOL) injection 5 mg  5 mg IntraMUSCular Q6H PRN    benztropine (COGENTIN) tablet 1 mg  1 mg Oral BID PRN    diphenhydrAMINE (BENADRYL) injection 50 mg  50 mg IntraMUSCular BID PRN    hydrOXYzine HCL (ATARAX) tablet 50 mg  50 mg Oral TID PRN    LORazepam (ATIVAN) injection 1 mg  1 mg IntraMUSCular Q4H PRN    traZODone (DESYREL) tablet 50 mg  50 mg Oral QHS PRN    acetaminophen (TYLENOL) tablet 650 mg  650 mg Oral Q4H PRN    magnesium hydroxide (MILK OF MAGNESIA) 400 mg/5 mL oral suspension 30 mL  30 mL Oral DAILY PRN      SCHEDULED MEDICATIONS:   Current Facility-Administered Medications Medication Dose Route Frequency    ARIPiprazole lauroxil (ARISTADA) 1,064 mg/3.9 mL ER injection 1,064 mg  1,064 mg IntraMUSCular Q 2 MONTHS    valproic acid (as sodium salt) (DEPAKENE) 250 mg/5 mL (5 mL) oral solution 750 mg  750 mg Oral Q12H    Or    LORazepam (ATIVAN) injection 1 mg +++COURT ORDERED MEDICATION FOR REFUSAL OF VALPROIC ACID+++  1 mg IntraMUSCular Q12H          ASSESSMENT & PLAN     DIAGNOSES REQUIRING ACTIVE TREATMENT AND MONITORING: (reviewed/updated 11/21/2021)  Patient Active Hospital Problem List:   Assessment: patient with decompensation in the field secondary to medication non-compliance. He is significantly impaired and will require stabilization with typical eliseo agents. Plan:  COURT ORDERED MEDICATIONS:    Aristada CHOU 1064 mg [id number] for psychosis CHOU   Depakote oral soln 750 mg *OR* Ativan 1 mg Q12H for eliseo   VPA level 82 on 11/17  - Increase trazodone 100 mg PO Qhs  - IGM therapy as tolerated  - Expand database / obtain collateral  - Dispo planning (home when stable)     I will continue to monitor blood levels (valproic acid---a drug with a narrow therapeutic index= NTI) and associated labs for drug therapy implemented that require intense monitoring for toxicity as deemed appropriate based on current medication side effects and pharmacodynamically determined drug 1/2 lives. In summary, Leopoldo Rizzo, is a 27 y.o.  male who presents with a severe exacerbation of the principal diagnosis of Bipolar disorder, current episode manic severe with psychotic features ([facility] [address]    Patient's condition is improving. Patient requires continued inpatient hospitalization for further stabilization, safety monitoring and medication management. I will continue to coordinate the provision of individual, milieu, occupational, group, and substance abuse therapies to address target symptoms/diagnoses as deemed appropriate for the individual patient.   A coordinated, multidisplinary treatment team round was conducted with the patient (this team consists of the nurse, psychiatric unit pharmacist,  and writer). Complete current electronic health record for patient has been reviewed today including consultant notes, ancillary staff notes, nurses and psychiatric tech notes. Suicide risk assessment completed and patient deemed to be of low risk for suicide at this time. The following regarding medications was addressed during rounds with patient:   the risks and benefits of the proposed medication. The patient was given the opportunity to ask questions. Informed consent given to the use of the above medications. Will continue to adjust psychiatric and non-psychiatric medications (see above \"medication\" section and orders section for details) as deemed appropriate & based upon diagnoses and response to treatment. I will continue to order blood tests/labs and diagnostic tests as deemed appropriate and review results as they become available (see orders for details and above listed lab/test results). I will order psychiatric records from previous [facility] hospitals to further elucidate the nature of patient's psychopathology and review once available. I will gather additional collateral information from friends, family and o/p treatment team to further elucidate the nature of patient's psychopathology and baselline level of psychiatric functioning. I certify that this patient's inpatient psychiatric hospital services furnished since the previous certification were, and continue to be, required for treatment that could reasonably be expected to improve the patient's condition, or for diagnostic study, and that the patient continues to need, on a daily basis, active treatment furnished directly by or requiring the supervision of inpatient psychiatric facility personnel.  In addition, the hospital records show that services furnished were intensive treatment services, admission or related services, or equivalent services.     EXPECTED DISCHARGE DATE/DAY: TBD     DISPOSITION: Home       Signed By:   Rod Kaplan MD  11/21/2021

## 2021-11-21 NOTE — BH NOTES
GROUP THERAPY PROGRESS NOTE    Patient did not participate in Coping Skills group.      Justine Ramsay MSW

## 2021-11-21 NOTE — PROGRESS NOTES
Problem: Psychosis  Goal: *STG: Remains safe in hospital  Outcome: Progressing Towards Goal  Goal: *STG: Patient will verbalize issues that get in their way of progress (i.e.: anger, fear etc.)  Outcome: Not Progressing Towards Goal

## 2021-11-21 NOTE — BH NOTES
Assumed care of the patient. Patient was mostly isolative to his room. He appeared preoccupied. When ever question relating to assessment were being asked patient responded with, \" No question or not right now. \"     Patient agreed for vitals when approached for the second time. Vital signs stable. Patient was compliant with medications with some encouragement. Patient was observed walking in the hallway without his shirt on  at one time but patient responded well with redirection. Will continue to monitor and provide support as needed. Patient was seen walking int the hallway a few more times through out the night with no signs of agitation. Patient appeared to be sleeping for about 4.5 hours.

## 2021-11-21 NOTE — BH NOTES
After receiving report from the nurse on previous shift. VS were obtained on patient along with general assessment. Patient is alert & oriented, denies SI/HI/AVH, and is currently compliant with scheduled medications. Pt has fixed delusion stating he is god and has made everybody here. Pt has no issues with pain or sleep and has been tolerating meals appropriately. Pt reported being hungry. Staff will continue to monitor patient for safety and encourage patient to attend groups and verbalize any issues or concerns.

## 2021-11-22 PROCEDURE — 74011250637 HC RX REV CODE- 250/637: Performed by: PSYCHIATRY & NEUROLOGY

## 2021-11-22 PROCEDURE — 65220000003 HC RM SEMIPRIVATE PSYCH

## 2021-11-22 PROCEDURE — 99232 SBSQ HOSP IP/OBS MODERATE 35: CPT | Performed by: PSYCHIATRY & NEUROLOGY

## 2021-11-22 RX ORDER — TRAZODONE HYDROCHLORIDE 100 MG/1
100 TABLET ORAL
Status: DISCONTINUED | OUTPATIENT
Start: 2021-11-22 | End: 2021-11-27 | Stop reason: HOSPADM

## 2021-11-22 RX ADMIN — VALPROIC ACID 750 MG: 250 SOLUTION ORAL at 08:18

## 2021-11-22 RX ADMIN — TRAZODONE HYDROCHLORIDE 100 MG: 100 TABLET ORAL at 21:00

## 2021-11-22 RX ADMIN — VALPROIC ACID 750 MG: 250 SOLUTION ORAL at 20:58

## 2021-11-22 NOTE — BH NOTES
PSYCHIATRIC PROGRESS NOTE         Patient Name  Bautista Lau   Date of Birth 1990   [facility] [id number]   Medical Record Number  [id number]      Age  27 y.o. PCP None   Admit date:  11/9/2021    Room Number  690/85  @ [facility]   Date of Service  11/22/2021         E & M PROGRESS NOTE:         HISTORY       CC:  \"psychosis\"  HISTORY OF PRESENT ILLNESS/INTERVAL HISTORY:  (reviewed/updated 11/22/2021). per initial evaluation: The patient, Bautista Lau, is a 27 y.o. BLACK/ male with a past psychiatric history significant for schizoaffective vs bipolar disorder, who presents at this time with complaints of (and/or evidence of) the following emotional symptoms: psychotic behavior and eliseo. Additional symptomatology include poor self care. The above symptoms have been present for 2+ weeks. These symptoms are of moderate to high severity. These symptoms are constant in nature. The patient's condition has been precipitated by psychosocial stressors. Patient's condition made worse by treatment noncompliance. UDS: negative; BAL=0.     Per admission documentation, the patient was picked up by police following suspicious car report; he had run out of gas en route to [place] and was unable to fully explain his situation. Patient has been guarded and uncooperative on the unit, pacing the hallway and responding to internal stimuli. Patient grossly incoherent on interview, dismissive and suspicious.       The patient is a poor and uncooperative historian. The patient corroborates the above narrative. The patient contracts for safety on the unit and gives consent for the team to contact collateral. The patient is not amenable to initiating treatment while on the unit. Patient states he ran out of gas and offers little information regarding how he came to be in [place] or why he was traveling to [place] from his native [place] -- he states he lives in [place] though this is not corroborated.  Patient ends interview abruptly and continues to pace the unit. Per collateral, patient has not been compliant with medications in the past and has had prior episodes of psychosis. 11/11 - the patient slept 4 hours overnight. He remains labile and disorganized, with inappropriate actions such as masturbating at he nurses' station. Patient refused medications and labwork, still pacing the unit with an odd affect. No PRNs given as patient is verbally redirectable, no physical aggression. Court order pending for 11/12 for forced medication during TDO hearing. 11/12 - patient remains uncooperative with treatment; he is bizarre and illogical on interview, still stating his family is dead. He slept 6.5 hours, refused all assessments, vitals and medications. Patient irritable, wants to leave the hospital today. Per family, he has the keys to his mother's car and will not return them (per SW, the keys cannot be taken from the hospital unless he releases his personal property to them). 11/15 - no significant improvements noted. Patient slept 5 hours overnight, he remains disorganized, guarded, states his family is 'dead' and refuses all assessments. Patient got no PRNs for agitation as verbal redirection has been successful. Patient discharge focused, unable to provide any meaningful information about his admission but has been refusing the team's offer to contact his family for further treatment planning. Patient compliant with court ordered medication. 11/16 - no acute overnight events. Patient slept 7.75 hours overnight. He is compliant with court ordered medication and denies SI/HI/AVH; patient remains guarded and suspicious about staff and will not speak with his family despite encouragement from team. He is isolative to room today and discharge focused. No agitation PRNs given. 11/17 - the patient has been isolative, disorganized but coherent, refusing vital signs but compliant with court ordered medication.  He is less internally preoccupied, discharge focused, continues to refuse any communication with his family, whom he states do not exist or have  previously. Of note, the patient lists his mother's name as 'Kamari,' and states he last saw her in , then 0. He is unable to reality test, repeatedly states he will be continuing on to [place] to meet his gf. VPA level 82.     - patient still pacing the hallway overnight but has been more cooperative this morning and willing to have team reach out to his family. Patient calm, took court ordered medications but remains irritable, discharge focused. Per family collateral, patient is still far from his baseline and this is his 4th episode this year. Family session for tomorrow to go over goals of care.  - overnight, the patient slept 2 hours. He is pacing but verbally redirectable. Compliant with PO medications. Patient discharge focused, he denies SI/PI/AVH/PI but remains disorganized. Family mtg this afternoon to discuss goals of care.  - Berkleykaleigh Matthewroe reports feeling well and inquired when he could go home. Moods are good. Denies SI/HI/AH/VH. No aggression or violence. Quiet and isolative per staff. Appropriately interactive and aware. Tolerating medications well. Eating and sleeping poorly (4hrs).  - Jerryevelioyamel Matthewroe reports feeling the same as yesterday. Moods are good. Denies SI/HI/AH/VH. No aggression or violence. Appropriately interactive and aware. Tolerating medications well. Eating and sleeping poorly at 4.5 hrs.     - patient slept 4.5 hours total. Patient noted to be responding to internal stimuli. No PRNs given. Patient refused vital signs this morning, he is discharge focused. Patient denies SI/HI/AVH/PI; he states his family wants him home as soon as possible though per brother this is not the case. No acute overnight events. SIDE EFFECTS: (reviewed/updated 2021)  None reported or admitted to. ALLERGIES:(reviewed/updated 11/22/2021)  No Known Allergies   MEDICATIONS PRIOR TO ADMISSION:(reviewed/updated 11/22/2021)  No medications prior to admission. PAST MEDICAL HISTORY: Past medical history from the initial psychiatric evaluation has been reviewed (reviewed/updated 11/22/2021) with no additional updates (I asked patient and no additional past medical history provided). History reviewed. No pertinent past medical history. History reviewed. No pertinent surgical history. SOCIAL HISTORY: Social history from the initial psychiatric evaluation has been reviewed (reviewed/updated 11/22/2021) with no additional updates (I asked patient and no additional social history provided). Social History     Socioeconomic History    Marital status: SINGLE     Spouse name: Not on file    Number of children: Not on file    Years of education: Not on file    Highest education level: Not on file   Occupational History    Not on file   Tobacco Use    Smoking status: Current Some Day Smoker    Smokeless tobacco: Not on file   Substance and Sexual Activity    Alcohol use: Not Currently    Drug use: Not Currently    Sexual activity: Not Currently   Other Topics Concern    Not on file   Social History Narrative    Not on file     Social Determinants of Health     Financial Resource Strain:     Difficulty of Paying Living Expenses: Not on file   Food Insecurity:     Worried About Running Out of Food in the Last Year: Not on file    Ana of Food in the Last Year: Not on file   Transportation Needs:     Lack of Transportation (Medical): Not on file    Lack of Transportation (Non-Medical):  Not on file   Physical Activity:     Days of Exercise per Week: Not on file    Minutes of Exercise per Session: Not on file   Stress:     Feeling of Stress : Not on file   Social Connections:     Frequency of Communication with Friends and Family: Not on file    Frequency of Social Gatherings with Friends and Family: Not on file    Attends Temple Services: Not on file    Active Member of Clubs or Organizations: Not on file    Attends Club or Organization Meetings: Not on file    Marital Status: Not on file   Intimate Partner Violence:     Fear of Current or Ex-Partner: Not on file    Emotionally Abused: Not on file    Physically Abused: Not on file    Sexually Abused: Not on file   Housing Stability:     Unable to Pay for Housing in the Last Year: Not on file    Number of [place] in the Last Year: Not on file    Unstable Housing in the Last Year: Not on file      FAMILY HISTORY: Family history from the initial psychiatric evaluation has been reviewed (reviewed/updated 11/22/2021) with no additional updates (I asked patient and no additional family history provided). History reviewed. No pertinent family history. REVIEW OF SYSTEMS: (reviewed/updated 11/22/2021)  Appetite:no change from normal   Sleep: poor with DIMS (difficulty initiating & maintaining sleep)   All other Review of Systems: Negative except per HPI         [address] (MSE):    MSE FINDINGS ARE WITHIN NORMAL LIMITS (WNL) UNLESS OTHERWISE STATED BELOW. ( ALL OF THE BELOW CATEGORIES OF THE MSE HAVE BEEN REVIEWED (reviewed 11/22/2021) AND UPDATED AS DEEMED APPROPRIATE )  General Presentation age appropriate, guarded and uncooperative   Orientation disorganized, oriented to time, place and person   Vital Signs  See below (reviewed 11/22/2021); Vital Signs (BP, Pulse, & Temp) are within normal limits if not listed below.    Gait and Station Stable/steady, no ataxia   Musculoskeletal System No extrapyramidal symptoms (EPS); no abnormal muscular movements or Tardive Dyskinesia (TD); muscle strength and tone are within normal limits   Language No aphasia or dysarthria   Speech:  hypoverbal and increased latency of response   Thought Processes illogical; normal rate of thoughts; poor abstract reasoning/computation   Thought Associations loose associations   Thought Content preoccupations and internally preoccupied   Suicidal Ideations none   Homicidal Ideations none   Mood:  hostile  and irritable   Affect:  constricted and increased in intensity   Memory recent  fair   Memory remote:  intact   Concentration/Attention:  intact   Fund of Knowledge average   Insight:  poor   Reliability fair   Judgment:  poor          VITALS:     Patient Vitals for the past 24 hrs:   Resp   11/21/21 1911 18     Wt Readings from Last 3 Encounters:   11/10/21 90.7 kg (200 lb)     Temp Readings from Last 3 Encounters:   No data found for Temp     BP Readings from Last 3 Encounters:   11/20/21 125/74     Pulse Readings from Last 3 Encounters:   11/20/21 77            DATA     LABORATORY DATA:(reviewed/updated 11/22/2021)  No results found for this or any previous visit (from the past 24 hour(s)). Lab Results   Component Value Date/Time    Valproic acid 82 11/17/2021 05:46 AM     No results found for: LITHM   RADIOLOGY REPORTS:(reviewed/updated 11/22/2021)  No results found.        MEDICATIONS     ALL MEDICATIONS:   Current Facility-Administered Medications   Medication Dose Route Frequency    traZODone (DESYREL) tablet 100 mg  100 mg Oral QHS PRN    ARIPiprazole lauroxil (ARISTADA) 1,064 mg/3.9 mL ER injection 1,064 mg  1,064 mg IntraMUSCular Q 2 MONTHS    valproic acid (as sodium salt) (DEPAKENE) 250 mg/5 mL (5 mL) oral solution 750 mg  750 mg Oral Q12H    Or    LORazepam (ATIVAN) injection 1 mg +++COURT ORDERED MEDICATION FOR REFUSAL OF VALPROIC ACID+++  1 mg IntraMUSCular Q12H    OLANZapine (ZyPREXA) tablet 5 mg  5 mg Oral Q6H PRN    haloperidol lactate (HALDOL) injection 5 mg  5 mg IntraMUSCular Q6H PRN    benztropine (COGENTIN) tablet 1 mg  1 mg Oral BID PRN    diphenhydrAMINE (BENADRYL) injection 50 mg  50 mg IntraMUSCular BID PRN    hydrOXYzine HCL (ATARAX) tablet 50 mg  50 mg Oral TID PRN    LORazepam (ATIVAN) injection 1 mg  1 mg IntraMUSCular Q4H PRN    acetaminophen (TYLENOL) tablet 650 mg  650 mg Oral Q4H PRN    magnesium hydroxide (MILK OF MAGNESIA) 400 mg/5 mL oral suspension 30 mL  30 mL Oral DAILY PRN      SCHEDULED MEDICATIONS:   Current Facility-Administered Medications   Medication Dose Route Frequency    ARIPiprazole lauroxil (ARISTADA) 1,064 mg/3.9 mL ER injection 1,064 mg  1,064 mg IntraMUSCular Q 2 MONTHS    valproic acid (as sodium salt) (DEPAKENE) 250 mg/5 mL (5 mL) oral solution 750 mg  750 mg Oral Q12H    Or    LORazepam (ATIVAN) injection 1 mg +++COURT ORDERED MEDICATION FOR REFUSAL OF VALPROIC ACID+++  1 mg IntraMUSCular Q12H          ASSESSMENT & PLAN     DIAGNOSES REQUIRING ACTIVE TREATMENT AND MONITORING: (reviewed/updated 11/22/2021)  Patient Active Hospital Problem List:   Assessment: patient with decompensation in the field secondary to medication non-compliance. He is significantly impaired and will require stabilization with typical eliseo agents. Plan:  COURT ORDERED MEDICATIONS:    Aristada CHOU 1064 mg [id number] for psychosis CHOU (next due 01/22/21)   Depakote oral soln 750 mg *OR* Ativan 1 mg Q12H for eliseo   VPA level 82 on 11/17  - CONTINUE Trazodone 100 mg PO QHS for insomnia  - IGM therapy as tolerated  - Expand database / obtain collateral  - Dispo planning (home when stable)     I will continue to monitor blood levels (valproic acid---a drug with a narrow therapeutic index= NTI) and associated labs for drug therapy implemented that require intense monitoring for toxicity as deemed appropriate based on current medication side effects and pharmacodynamically determined drug 1/2 lives. In summary, Jame Cruz, is a 27 y.o.  male who presents with a severe exacerbation of the principal diagnosis of Bipolar disorder, current episode manic severe with psychotic features ([address]    Patient's condition is improving.     Patient requires continued inpatient hospitalization for further stabilization, safety monitoring and medication management. I will continue to coordinate the provision of individual, milieu, occupational, group, and substance abuse therapies to address target symptoms/diagnoses as deemed appropriate for the individual patient. A coordinated, multidisplinary treatment team round was conducted with the patient (this team consists of the nurse, psychiatric unit pharmacist,  and writer). Complete current electronic health record for patient has been reviewed today including consultant notes, ancillary staff notes, nurses and psychiatric tech notes. Suicide risk assessment completed and patient deemed to be of low risk for suicide at this time. The following regarding medications was addressed during rounds with patient:   the risks and benefits of the proposed medication. The patient was given the opportunity to ask questions. Informed consent given to the use of the above medications. Will continue to adjust psychiatric and non-psychiatric medications (see above \"medication\" section and orders section for details) as deemed appropriate & based upon diagnoses and response to treatment. I will continue to order blood tests/labs and diagnostic tests as deemed appropriate and review results as they become available (see orders for details and above listed lab/test results). I will order psychiatric records from previous [facility] hospitals to further elucidate the nature of patient's psychopathology and review once available. I will gather additional collateral information from friends, family and o/p treatment team to further elucidate the nature of patient's psychopathology and baselline level of psychiatric functioning.          I certify that this patient's inpatient psychiatric hospital services furnished since the previous certification were, and continue to be, required for treatment that could reasonably be expected to improve the patient's condition, or for diagnostic study, and that the patient continues to need, on a daily basis, active treatment furnished directly by or requiring the supervision of inpatient psychiatric facility personnel. In addition, the hospital records show that services furnished were intensive treatment services, admission or related services, or equivalent services.     EXPECTED DISCHARGE DATE/DAY: TBD     DISPOSITION: Home       Signed By:   Ana Lilia Dial MD  11/22/2021

## 2021-11-22 NOTE — BH NOTES
Assumed care of the patient. Patient was visible walking in the hallway for the most part of the shift. He seemed to be preoccupied and responding to internal stimuli however he denied A/V/H. Patient also denied S.I/H.I, depression and anxiety. He refused vital signs, was non acceptance when was educated and declined PRN medications and support when offered. Patient took his scheduled medication. Will continue to monitor and provide support as needed. Patient appeared to be sleeping for about 4.5 hours.

## 2021-11-22 NOTE — BH NOTES
GROUP THERAPY PROGRESS NOTE    Patient did not participate in Discharge Planning Group.      Nick Arango LPC [facility]

## 2021-11-22 NOTE — BH NOTES
GROUP THERAPY PROGRESS NOTE    Patient did not participate in Recreational Therapy/Coping Skills Group.      TAMY Randall

## 2021-11-22 NOTE — BH NOTES
GROUP THERAPY PROGRESS NOTE    Patient did not participate in Recreational Therapy/Coping Skills Group.     TAMY Veras

## 2021-11-22 NOTE — BH NOTES
Behavioral Health Treatment Team Note      Patient goal(s) for today: continue taking medication as prescribed, engage in unit activities, participate in hygiene/ADLs, follow directions from staff, contact support team, prepare for discharge  Treatment team focus/goals: medication adjustments, dispo planning, update support system  Progress note: Pt was seen in treatment team this morning. Pt is alert and oriented. Pt denies SI/HI. Pt's mood is euthymic, affect is brighter. Pt's thought process is more organized and no delusional themes were noted. Pt continues to ask to leave but family feels he needs continued stabilization in the hospital at this time. Pt's insight and judgment is impaired, reliability is questionable.   Social work department will continue to coordinate discharge plans.      LOS:  13                       Expected LOS: 11/29 or 11/30     Financial concerns/prescription coverage:  Medicaid   Date of last family contact:  Brother Albaro Marcus  [phone]                    Family requesting physician contact today:  No  Discharge plan: TBD  Guns in the home: None reported.                                                       Outpatient provider(s): To be linked.

## 2021-11-22 NOTE — PROGRESS NOTES
Problem: Psychosis  Goal: *STG: Decreased hallucinations  Outcome: Progressing Towards Goal  Goal: *STG: Decreased delusional thinking  Outcome: Progressing Towards Goal       0730 Pt received in room. Pt denies si/hi/ah/vh. Pt reports going to groups and eating outside in the dayroom. Anxiety=0/10 depression= 0/10. Pt is Aox4. Pt is calm and cooperative. Med and meal compliant. Pain level of 0/10 . Will continue to monitor for any changes.        Please see MAR for PRN medication administration

## 2021-11-23 PROCEDURE — 99232 SBSQ HOSP IP/OBS MODERATE 35: CPT | Performed by: PSYCHIATRY & NEUROLOGY

## 2021-11-23 PROCEDURE — 65220000003 HC RM SEMIPRIVATE PSYCH

## 2021-11-23 PROCEDURE — 74011250637 HC RX REV CODE- 250/637: Performed by: PSYCHIATRY & NEUROLOGY

## 2021-11-23 RX ADMIN — VALPROIC ACID 750 MG: 250 SOLUTION ORAL at 08:16

## 2021-11-23 RX ADMIN — TRAZODONE HYDROCHLORIDE 100 MG: 100 TABLET ORAL at 21:40

## 2021-11-23 RX ADMIN — VALPROIC ACID 750 MG: 250 SOLUTION ORAL at 21:39

## 2021-11-23 NOTE — BH NOTES
Assumed care of the patient. Patient was visible walking in  the hallway and sometimes making his needs known to the staffs otherwise he was isolative to his room. Patient denied S.I/H.I/A//VH, anxiety and depression. Patient refused to engage in any further conversation by saying, \" I want to go to sleep. I don't wish to answer any questions. \"     Later patient asked if he was going to get discharged and said that he thinks so because he had seen a ticket to [place] in the computer. Patient was meal and medicine compliant and was asking if he could get pills of valporic acid because the oral solution does not taste good. Patient was seen coming out of the room and walking a few times in the middle of the night, patient also talked about leaving. Informed and educated patient on his admission status, patient was acceptance. Patient appeared to be sleeping for about 4 hours.

## 2021-11-23 NOTE — BH NOTES
Andre Amaya   MRN: [id number]    Department:  [facility] Emergency Department   Date of Visit:  10/2/2017           Disclosure     Insurance plans vary and the physician(s) referred by the ER may not be covered by your plan.  Please contact you GROUP THERAPY PROGRESS NOTE    Patient did not participate in Recreational Therapy Group.      TAMY Grover If you have been prescribed any medication(s), please fill your prescription right away and begin taking the medication(s) as directed    If the emergency physician has read X-rays, these will be re-interpreted by a radiologist.  If there is a significant

## 2021-11-23 NOTE — BH NOTES
GROUP THERAPY PROGRESS NOTE    Patient did not participate in recreational therapy group.      [facility]

## 2021-11-23 NOTE — BH NOTES
PSYCHIATRIC PROGRESS NOTE         Patient Name  Annette Ordoñez   Date of Birth 1990   [facility] [id number]   Medical Record Number  [id number]      Age  27 y.o. PCP None   Admit date:  11/9/2021    Room Number  258/37  @ [facility]   Date of Service  11/23/2021         E & M PROGRESS NOTE:         HISTORY       CC:  \"psychosis\"  HISTORY OF PRESENT ILLNESS/INTERVAL HISTORY:  (reviewed/updated 11/23/2021). per initial evaluation: The patient, Annette Ordoñez, is a 27 y.o. BLACK/ male with a past psychiatric history significant for schizoaffective vs bipolar disorder, who presents at this time with complaints of (and/or evidence of) the following emotional symptoms: psychotic behavior and eliseo. Additional symptomatology include poor self care. The above symptoms have been present for 2+ weeks. These symptoms are of moderate to high severity. These symptoms are constant in nature. The patient's condition has been precipitated by psychosocial stressors. Patient's condition made worse by treatment noncompliance. UDS: negative; BAL=0.     Per admission documentation, the patient was picked up by police following suspicious car report; he had run out of gas en route to [place] and was unable to fully explain his situation. Patient has been guarded and uncooperative on the unit, pacing the hallway and responding to internal stimuli. Patient grossly incoherent on interview, dismissive and suspicious.       The patient is a poor and uncooperative historian. The patient corroborates the above narrative. The patient contracts for safety on the unit and gives consent for the team to contact collateral. The patient is not amenable to initiating treatment while on the unit. Patient states he ran out of gas and offers little information regarding how he came to be in [place] or why he was traveling to [place] from his native [place] -- he states he lives in [place] though this is not corroborated.  Patient ends interview abruptly and continues to pace the unit. Per collateral, patient has not been compliant with medications in the past and has had prior episodes of psychosis. 11/11 - the patient slept 4 hours overnight. He remains labile and disorganized, with inappropriate actions such as masturbating at he nurses' station. Patient refused medications and labwork, still pacing the unit with an odd affect. No PRNs given as patient is verbally redirectable, no physical aggression. Court order pending for 11/12 for forced medication during TDO hearing. 11/12 - patient remains uncooperative with treatment; he is bizarre and illogical on interview, still stating his family is dead. He slept 6.5 hours, refused all assessments, vitals and medications. Patient irritable, wants to leave the hospital today. Per family, he has the keys to his mother's car and will not return them (per SW, the keys cannot be taken from the hospital unless he releases his personal property to them). 11/15 - no significant improvements noted. Patient slept 5 hours overnight, he remains disorganized, guarded, states his family is 'dead' and refuses all assessments. Patient got no PRNs for agitation as verbal redirection has been successful. Patient discharge focused, unable to provide any meaningful information about his admission but has been refusing the team's offer to contact his family for further treatment planning. Patient compliant with court ordered medication. 11/16 - no acute overnight events. Patient slept 7.75 hours overnight. He is compliant with court ordered medication and denies SI/HI/AVH; patient remains guarded and suspicious about staff and will not speak with his family despite encouragement from team. He is isolative to room today and discharge focused. No agitation PRNs given. 11/17 - the patient has been isolative, disorganized but coherent, refusing vital signs but compliant with court ordered medication.  He is less internally preoccupied, discharge focused, continues to refuse any communication with his family, whom he states do not exist or have  previously. Of note, the patient lists his mother's name as 'Kamari,' and states he last saw her in , then 0. He is unable to reality test, repeatedly states he will be continuing on to [place] to meet his gf. VPA level 82.     - patient still pacing the hallway overnight but has been more cooperative this morning and willing to have team reach out to his family. Patient calm, took court ordered medications but remains irritable, discharge focused. Per family collateral, patient is still far from his baseline and this is his 4th episode this year. Family session for tomorrow to go over goals of care.  - overnight, the patient slept 2 hours. He is pacing but verbally redirectable. Compliant with PO medications. Patient discharge focused, he denies SI/PI/AVH/PI but remains disorganized. Family mtg this afternoon to discuss goals of care.  - Eduard Spencer reports feeling well and inquired when he could go home. Moods are good. Denies SI/HI/AH/VH. No aggression or violence. Quiet and isolative per staff. Appropriately interactive and aware. Tolerating medications well. Eating and sleeping poorly (4hrs).  - Eduard Spencer reports feeling the same as yesterday. Moods are good. Denies SI/HI/AH/VH. No aggression or violence. Appropriately interactive and aware. Tolerating medications well. Eating and sleeping poorly at 4.5 hrs.     - patient slept 4.5 hours total. Patient noted to be responding to internal stimuli. No PRNs given. Patient refused vital signs this morning, he is discharge focused. Patient denies SI/HI/AVH/PI; he states his family wants him home as soon as possible though per brother this is not the case. No acute overnight events.  - overnight, patient slept 4 hours.  He is discharge focused, printing out bus schedules and stating his family is hoping for him to return to [place] today (per , this is not the case). Patient denies SI/HI/AVH/PI. He is guarded per nursing staff, allowed vital signs this morning, and appeared suspicious. Patient requesting another family session to go over his disposition options. SIDE EFFECTS: (reviewed/updated 11/23/2021)  None reported or admitted to. ALLERGIES:(reviewed/updated 11/23/2021)  No Known Allergies   MEDICATIONS PRIOR TO ADMISSION:(reviewed/updated 11/23/2021)  No medications prior to admission. PAST MEDICAL HISTORY: Past medical history from the initial psychiatric evaluation has been reviewed (reviewed/updated 11/23/2021) with no additional updates (I asked patient and no additional past medical history provided). History reviewed. No pertinent past medical history. History reviewed. No pertinent surgical history. SOCIAL HISTORY: Social history from the initial psychiatric evaluation has been reviewed (reviewed/updated 11/23/2021) with no additional updates (I asked patient and no additional social history provided).    Social History     Socioeconomic History    Marital status: SINGLE     Spouse name: Not on file    Number of children: Not on file    Years of education: Not on file    Highest education level: Not on file   Occupational History    Not on file   Tobacco Use    Smoking status: Current Some Day Smoker    Smokeless tobacco: Not on file   Substance and Sexual Activity    Alcohol use: Not Currently    Drug use: Not Currently    Sexual activity: Not Currently   Other Topics Concern    Not on file   Social History Narrative    Not on file     Social Determinants of Health     Financial Resource Strain:     Difficulty of Paying Living Expenses: Not on file   Food Insecurity:     Worried About Running Out of Food in the Last Year: Not on file    Ana of Food in the Last Year: Not on file   Transportation Needs:     Lack of Transportation (Medical): Not on file    Lack of Transportation (Non-Medical): Not on file   Physical Activity:     Days of Exercise per Week: Not on file    Minutes of Exercise per Session: Not on file   Stress:     Feeling of Stress : Not on file   Social Connections:     Frequency of Communication with Friends and Family: Not on file    Frequency of Social Gatherings with Friends and Family: Not on file    Attends Latter-day Services: Not on file    Active Member of [address] [place] [address] [employer] or Organizations: Not on file    Attends Club or Organization Meetings: Not on file    Marital Status: Not on file   Intimate Partner Violence:     Fear of Current or Ex-Partner: Not on file    Emotionally Abused: Not on file    Physically Abused: Not on file    Sexually Abused: Not on file   Housing Stability:     Unable to Pay for Housing in the Last Year: Not on file    Number of [place] in the Last Year: Not on file    Unstable Housing in the Last Year: Not on file      FAMILY HISTORY: Family history from the initial psychiatric evaluation has been reviewed (reviewed/updated 11/23/2021) with no additional updates (I asked patient and no additional family history provided). History reviewed. No pertinent family history. REVIEW OF SYSTEMS: (reviewed/updated 11/23/2021)  Appetite:no change from normal   Sleep: poor with DIMS (difficulty initiating & maintaining sleep)   All other Review of Systems: Negative except per HPI         [facility] (MSE):    MSE FINDINGS ARE WITHIN NORMAL LIMITS (WNL) UNLESS OTHERWISE STATED BELOW. ( ALL OF THE BELOW CATEGORIES OF THE MSE HAVE BEEN REVIEWED (reviewed 11/23/2021) AND UPDATED AS DEEMED APPROPRIATE )  General Presentation age appropriate, guarded and uncooperative   Orientation disorganized, oriented to time, place and person   Vital Signs  See below (reviewed 11/23/2021);  Vital Signs (BP, Pulse, & Temp) are within normal limits if not listed below.   Gait and Station Stable/steady, no ataxia   Musculoskeletal System No extrapyramidal symptoms (EPS); no abnormal muscular movements or Tardive Dyskinesia (TD); muscle strength and tone are within normal limits   Language No aphasia or dysarthria   Speech:  hypoverbal and increased latency of response   Thought Processes illogical; normal rate of thoughts; poor abstract reasoning/computation   Thought Associations loose associations   Thought Content preoccupations and internally preoccupied   Suicidal Ideations none   Homicidal Ideations none   Mood:  hostile  and irritable   Affect:  constricted and increased in intensity   Memory recent  fair   Memory remote:  intact   Concentration/Attention:  intact   Fund of Knowledge average   Insight:  poor   Reliability fair   Judgment:  poor          VITALS:     Patient Vitals for the past 24 hrs:   Temp Pulse Resp BP SpO2   11/23/21 0819 98 °F (36.7 °C) 79 18 114/77 100 %   11/22/21 2006 98.8 °F (37.1 °C) 69 16 112/68 100 %     Wt Readings from Last 3 Encounters:   11/10/21 90.7 kg (200 lb)     Temp Readings from Last 3 Encounters:   11/23/21 98 °F (36.7 °C)     BP Readings from Last 3 Encounters:   11/23/21 114/77     Pulse Readings from Last 3 Encounters:   11/23/21 79            DATA     LABORATORY DATA:(reviewed/updated 11/23/2021)  No results found for this or any previous visit (from the past 24 hour(s)). Lab Results   Component Value Date/Time    Valproic acid 82 11/17/2021 05:46 AM     No results found for: LITH   RADIOLOGY REPORTS:(reviewed/updated 11/23/2021)  No results found.        MEDICATIONS     ALL MEDICATIONS:   Current Facility-Administered Medications   Medication Dose Route Frequency    traZODone (DESYREL) tablet 100 mg  100 mg Oral QHS    ARIPiprazole lauroxil (ARISTADA) 1,064 mg/3.9 mL ER injection 1,064 mg  1,064 mg IntraMUSCular Q 2 MONTHS    valproic acid (as sodium salt) (DEPAKENE) 250 mg/5 mL (5 mL) oral solution 750 mg  750 mg Oral Q12H    Or    LORazepam (ATIVAN) injection 1 mg +++COURT ORDERED MEDICATION FOR REFUSAL OF VALPROIC ACID+++  1 mg IntraMUSCular Q12H    OLANZapine (ZyPREXA) tablet 5 mg  5 mg Oral Q6H PRN    haloperidol lactate (HALDOL) injection 5 mg  5 mg IntraMUSCular Q6H PRN    benztropine (COGENTIN) tablet 1 mg  1 mg Oral BID PRN    diphenhydrAMINE (BENADRYL) injection 50 mg  50 mg IntraMUSCular BID PRN    hydrOXYzine HCL (ATARAX) tablet 50 mg  50 mg Oral TID PRN    LORazepam (ATIVAN) injection 1 mg  1 mg IntraMUSCular Q4H PRN    acetaminophen (TYLENOL) tablet 650 mg  650 mg Oral Q4H PRN    magnesium hydroxide (MILK OF MAGNESIA) 400 mg/5 mL oral suspension 30 mL  30 mL Oral DAILY PRN      SCHEDULED MEDICATIONS:   Current Facility-Administered Medications   Medication Dose Route Frequency    traZODone (DESYREL) tablet 100 mg  100 mg Oral QHS    ARIPiprazole lauroxil (ARISTADA) 1,064 mg/3.9 mL ER injection 1,064 mg  1,064 mg IntraMUSCular Q 2 MONTHS    valproic acid (as sodium salt) (DEPAKENE) 250 mg/5 mL (5 mL) oral solution 750 mg  750 mg Oral Q12H    Or    LORazepam (ATIVAN) injection 1 mg +++COURT ORDERED MEDICATION FOR REFUSAL OF VALPROIC ACID+++  1 mg IntraMUSCular Q12H          ASSESSMENT & PLAN     DIAGNOSES REQUIRING ACTIVE TREATMENT AND MONITORING: (reviewed/updated 11/23/2021)  Patient Active Hospital Problem List:   Assessment: patient with decompensation in the field secondary to medication non-compliance. He is significantly impaired and will require stabilization with typical eliseo agents.     Plan:  COURT ORDERED MEDICATIONS:    Aristada CHOU 1064 mg [id number] for psychosis CHOU (next due 01/22/21)   Depakote oral soln 750 mg *OR* Ativan 1 mg Q12H for eliseo   VPA level 82 on 11/17  - CONTINUE Trazodone 100 mg PO QHS for insomnia  - IGM therapy as tolerated  - Expand database / obtain collateral  - Dispo planning (home when stable)     I will continue to monitor blood levels (valproic acid---a drug with a narrow therapeutic index= NTI) and associated labs for drug therapy implemented that require intense monitoring for toxicity as deemed appropriate based on current medication side effects and pharmacodynamically determined drug 1/2 lives. In summary, Jose Camarena, is a 27 y.o.  male who presents with a severe exacerbation of the principal diagnosis of Bipolar disorder, current episode manic severe with psychotic features ([address]    Patient's condition is improving. Patient requires continued inpatient hospitalization for further stabilization, safety monitoring and medication management. I will continue to coordinate the provision of individual, milieu, occupational, group, and substance abuse therapies to address target symptoms/diagnoses as deemed appropriate for the individual patient. A coordinated, multidisplinary treatment team round was conducted with the patient (this team consists of the nurse, psychiatric unit pharmacist,  and writer). Complete current electronic health record for patient has been reviewed today including consultant notes, ancillary staff notes, nurses and psychiatric tech notes. Suicide risk assessment completed and patient deemed to be of low risk for suicide at this time. The following regarding medications was addressed during rounds with patient:   the risks and benefits of the proposed medication. The patient was given the opportunity to ask questions. Informed consent given to the use of the above medications. Will continue to adjust psychiatric and non-psychiatric medications (see above \"medication\" section and orders section for details) as deemed appropriate & based upon diagnoses and response to treatment. I will continue to order blood tests/labs and diagnostic tests as deemed appropriate and review results as they become available (see orders for details and above listed lab/test results).     I will order psychiatric records from previous psych hospitals to further elucidate the nature of patient's psychopathology and review once available. I will gather additional collateral information from friends, family and o/p treatment team to further elucidate the nature of patient's psychopathology and baselline level of psychiatric functioning. I certify that this patient's inpatient psychiatric hospital services furnished since the previous certification were, and continue to be, required for treatment that could reasonably be expected to improve the patient's condition, or for diagnostic study, and that the patient continues to need, on a daily basis, active treatment furnished directly by or requiring the supervision of inpatient psychiatric facility personnel. In addition, the hospital records show that services furnished were intensive treatment services, admission or related services, or equivalent services.     EXPECTED DISCHARGE DATE/DAY: TBD     DISPOSITION: Home       Signed By:   Jailene Mullins MD  11/23/2021

## 2021-11-23 NOTE — BH NOTES
Behavioral Health Treatment Team Note      Patient goal(s) for today: continue taking medication as prescribed, engage in unit activities, participate in hygiene/ADLs, follow directions from staff, contact support team, prepare for discharge  Treatment team focus/goals: medication adjustments, dispo planning, update support system  Progress note: Pt was seen in treatment team this morning. Pt is alert and oriented. Pt denies SI/HI. Pt's mood is hostile, affect is flat. Pt's thought process is illogical and internally preoccupied. Pt remains discharge focused. Family is in agreement with plan for pt to take bus to [place] next Tuesday. SW awaiting a return call from [facility] regarding possible outpatient services. Pt's insight and judgment is poor, reliability is fair.  Social work department will continue to coordinate discharge plans.       LOS:  14                       Expected LOS: 11/30     Financial concerns/prescription coverage:  Medicaid   Date of last family contact:  Brother Albaro Fagan  [phone]  14/80              Family requesting physician contact today:  No  Discharge plan: TBD  Guns in the home: None reported.                                                       Outpatient provider(s): To be linked.

## 2021-11-23 NOTE — BH NOTES
Pt is visible on th unit but isolative to self. Pacing. Guarded. Pr denies si/hi/a/v simmons. Pt is accepting meals and medications. appears preoccupied. Pt is focused on discharge. Pt is encouraged to attend groups. appears preoccupied. Will continue to monitor pt.

## 2021-11-23 NOTE — PROGRESS NOTES
Problem: Psychosis  Goal: *STG: Remains safe in hospital  Outcome: Progressing Towards Goal  Goal: *STG: Patient will develop strategies to regulate emotions and corresponding behaviors  Outcome: Not Progressing Towards Goal

## 2021-11-23 NOTE — PROGRESS NOTES
Problem: Psychosis  Goal: *STG: Decreased delusional thinking  Outcome: Progressing Towards Goal  Goal: *STG: Remains safe in hospital  Outcome: Progressing Towards Goal  Goal: *STG: Seeks staff when feelings of self harm or harm towards others arise  Outcome: Progressing Towards Goal  Goal: *STG: Patient will verbalize areas in need of boundary recognition and limit setting  Outcome: Progressing Towards Goal  Goal: *STG: Patient will develop strategies to regulate emotions and corresponding behaviors  Outcome: Progressing Towards Goal  Goal: *STG: Accept constructive criticism without injury or isolation  Outcome: Progressing Towards Goal     Problem: Discharge Planning  Goal: *Discharge to safe environment  Outcome: Progressing Towards Goal  Goal: *Knowledge of medication management  Outcome: Progressing Towards Goal  Goal: *Knowledge of discharge instructions  Outcome: Progressing Towards Goal

## 2021-11-24 PROCEDURE — 74011250637 HC RX REV CODE- 250/637: Performed by: PSYCHIATRY & NEUROLOGY

## 2021-11-24 PROCEDURE — 99231 SBSQ HOSP IP/OBS SF/LOW 25: CPT | Performed by: PSYCHIATRY & NEUROLOGY

## 2021-11-24 PROCEDURE — 65220000003 HC RM SEMIPRIVATE PSYCH

## 2021-11-24 RX ADMIN — VALPROIC ACID 750 MG: 250 SOLUTION ORAL at 09:42

## 2021-11-24 RX ADMIN — TRAZODONE HYDROCHLORIDE 100 MG: 100 TABLET ORAL at 21:29

## 2021-11-24 RX ADMIN — VALPROIC ACID 750 MG: 250 SOLUTION ORAL at 21:29

## 2021-11-24 NOTE — PROGRESS NOTES
Patient has been out visible on the unit. Patient appears preoccupied. Denies SI/HI/AVH, anxiety and depression. Patient observed pacing in the hallway, appears to be talking to himself. Smiling inappropriately. 0130  Patient up requesting to go to the ED. Stating << I have blood coming from everywhere out my penis. >> No blood observed. Patient asked if he would do a urine sample to check for blood. The process and reason for the sample was explained to the patient. Patient refused and stated <<no I don't need that. I will just talk to the doctor tomorrow>>    0600  Nursing rounds completed. Patient slept for 3 hours this shift.               Problem: Psychosis  Goal: *STG: Remains safe in hospital  Outcome: Progressing Towards Goal

## 2021-11-24 NOTE — PROGRESS NOTES
Problem: Psychosis  Goal: *STG: Decreased delusional thinking  Outcome: Progressing Towards Goal  Goal: *STG: Remains safe in hospital  Outcome: Progressing Towards Goal      0730 Pt received in room. Pt refused vital signs. He denies si/hi/ah/vh. Anxiety=0/10 depression= 0/10. Pt is Aox4. Pt is calm and cooperative. Med and meal compliant. Pain level of 0/10 . Will continue to monitor for any changes.

## 2021-11-24 NOTE — BH NOTES
Behavioral Health Treatment Team Note      Patient goal(s) for today: continue taking medication as prescribed, engage in unit activities, participate in hygiene/ADLs, follow directions from staff, contact support team, prepare for discharge  Treatment team focus/goals: medication adjustments, dispo planning, update support system  Progress note: Pt was seen in treatment team this morning. Pt is alert and oriented. Pt denies SI/HI. Pt's mood is anxuous, affect is flat. Pt's thought process is more logical.  Pt remains discharge focused. Family is in agreement with plan for pt to take bus to [place] on Saturday. Referral send to [facility] Team for outpatient services- patient to begin in mid-December. SW provided pt with information on [address] services as requested. Pt reports blood is coming out of his penis, requested uranalysis. Pt's insight and judgment is poor, reliability is fair. Social work department will continue to coordinate discharge plans. ABDIAS called [facility] to inquire details about patient starting services, VM left.  SW awaiting return call.     LOS:  15                       Expected LOS: 11/30     Financial concerns/prescription coverage:  Medicaid   Date of last family contact:  Brother Albaro Lee  [phone]  81/39              Family requesting physician contact today:  No  Discharge plan: TBD  Guns in the home: None reported.                                                       Outpatient provider(s): To be linked.     Participating treatment team members: TAMY Mcgowan and Dr. Stephanie Marroquin

## 2021-11-24 NOTE — PROGRESS NOTES
Behavioral Services                                              Medicare Re-Certification    I certify that the inpatient psychiatric hospital services furnished since the previous certification/re-certification were, and continue to be, medically necessary for;    [x] (1) Treatment which could reasonably be expected to improve the patient's condition,    [x] (2) Or for diagnostic study. Estimated length of stay/service 11/28/21    Plan for post-hospital care home    This patient continues to need, on a daily basis, active treatment furnished directly by or requiring the supervision of inpatient psychiatric personnel.     Electronically signed by Jai Wilhelm MD on 11/24/2021 at 8:54 AM

## 2021-11-25 PROCEDURE — 74011250637 HC RX REV CODE- 250/637: Performed by: PSYCHIATRY & NEUROLOGY

## 2021-11-25 PROCEDURE — 65220000003 HC RM SEMIPRIVATE PSYCH

## 2021-11-25 RX ADMIN — TRAZODONE HYDROCHLORIDE 100 MG: 100 TABLET ORAL at 21:12

## 2021-11-25 RX ADMIN — VALPROIC ACID 750 MG: 250 SOLUTION ORAL at 08:49

## 2021-11-25 RX ADMIN — VALPROIC ACID 750 MG: 250 SOLUTION ORAL at 21:12

## 2021-11-25 NOTE — BH NOTES
During shift, pt remained out of bed. Pt refuses to answer questions pertaining to SI, HI, A/V hallucinations. Meal/med compliant. Currently, pt is ambulating the hallway. No signs of distress nor made any further complaints. Q15 min safety checks. Locked Unit. Will continue to monitor. Patient did not sleep this shift, respirations noted even and unlabored. Safe environment maintained.

## 2021-11-25 NOTE — BH NOTES
PSYCHIATRIC PROGRESS NOTE         Patient Name  Ina Mims   Date of Birth 1990   [facility] [id number]   Medical Record Number  [id number]      Age  27 y.o. PCP None   Admit date:  11/9/2021    Room Number  477/52  @ [facility]   Date of Service  11/25/2021         E & M PROGRESS NOTE:         HISTORY       CC:  \"psychosis\"  HISTORY OF PRESENT ILLNESS/INTERVAL HISTORY:  (reviewed/updated 11/25/2021). per initial evaluation: The patient, Ina Mims, is a 27 y.o. BLACK/ male with a past psychiatric history significant for schizoaffective vs bipolar disorder, who presents at this time with complaints of (and/or evidence of) the following emotional symptoms: psychotic behavior and eliseo. Additional symptomatology include poor self care. The above symptoms have been present for 2+ weeks. These symptoms are of moderate to high severity. These symptoms are constant in nature. The patient's condition has been precipitated by psychosocial stressors. Patient's condition made worse by treatment noncompliance. UDS: negative; BAL=0.     Per admission documentation, the patient was picked up by police following suspicious car report; he had run out of gas en route to [place] and was unable to fully explain his situation. Patient has been guarded and uncooperative on the unit, pacing the hallway and responding to internal stimuli. Patient grossly incoherent on interview, dismissive and suspicious.       The patient is a poor and uncooperative historian. The patient corroborates the above narrative. The patient contracts for safety on the unit and gives consent for the team to contact collateral. The patient is not amenable to initiating treatment while on the unit. Patient states he ran out of gas and offers little information regarding how he came to be in [place] or why he was traveling to [place] from his native [place] -- he states he lives in [place] though this is not corroborated.  Patient ends interview abruptly and continues to pace the unit. Per collateral, patient has not been compliant with medications in the past and has had prior episodes of psychosis. 11/11 - the patient slept 4 hours overnight. He remains labile and disorganized, with inappropriate actions such as masturbating at he nurses' station. Patient refused medications and labwork, still pacing the unit with an odd affect. No PRNs given as patient is verbally redirectable, no physical aggression. Court order pending for 11/12 for forced medication during TDO hearing. 11/12 - patient remains uncooperative with treatment; he is bizarre and illogical on interview, still stating his family is dead. He slept 6.5 hours, refused all assessments, vitals and medications. Patient irritable, wants to leave the hospital today. Per family, he has the keys to his mother's car and will not return them (per SW, the keys cannot be taken from the hospital unless he releases his personal property to them). 11/15 - no significant improvements noted. Patient slept 5 hours overnight, he remains disorganized, guarded, states his family is 'dead' and refuses all assessments. Patient got no PRNs for agitation as verbal redirection has been successful. Patient discharge focused, unable to provide any meaningful information about his admission but has been refusing the team's offer to contact his family for further treatment planning. Patient compliant with court ordered medication. 11/16 - no acute overnight events. Patient slept 7.75 hours overnight. He is compliant with court ordered medication and denies SI/HI/AVH; patient remains guarded and suspicious about staff and will not speak with his family despite encouragement from team. He is isolative to room today and discharge focused. No agitation PRNs given. 11/17 - the patient has been isolative, disorganized but coherent, refusing vital signs but compliant with court ordered medication.  He is less internally preoccupied, discharge focused, continues to refuse any communication with his family, whom he states do not exist or have  previously. Of note, the patient lists his mother's name as 'Kamari,' and states he last saw her in , then 0. He is unable to reality test, repeatedly states he will be continuing on to [place] to meet his gf. VPA level 82.     - patient still pacing the hallway overnight but has been more cooperative this morning and willing to have team reach out to his family. Patient calm, took court ordered medications but remains irritable, discharge focused. Per family collateral, patient is still far from his baseline and this is his 4th episode this year. Family session for tomorrow to go over goals of care.  - overnight, the patient slept 2 hours. He is pacing but verbally redirectable. Compliant with PO medications. Patient discharge focused, he denies SI/PI/AVH/PI but remains disorganized. Family mtg this afternoon to discuss goals of care.  - Myrtle Robles reports feeling well and inquired when he could go home. Moods are good. Denies SI/HI/AH/VH. No aggression or violence. Quiet and isolative per staff. Appropriately interactive and aware. Tolerating medications well. Eating and sleeping poorly (4hrs).  - Myrtle Robles reports feeling the same as yesterday. Moods are good. Denies SI/HI/AH/VH. No aggression or violence. Appropriately interactive and aware. Tolerating medications well. Eating and sleeping poorly at 4.5 hrs.     - patient slept 4.5 hours total. Patient noted to be responding to internal stimuli. No PRNs given. Patient refused vital signs this morning, he is discharge focused. Patient denies SI/HI/AVH/PI; he states his family wants him home as soon as possible though per brother this is not the case. No acute overnight events.  - overnight, patient slept 4 hours.  He is discharge focused, printing out bus schedules and stating his family is hoping for him to return to [place] today (per , this is not the case). Patient denies SI/HI/AVH/PI. He is guarded per nursing staff, allowed vital signs this morning, and appeared suspicious. Patient requesting another family session to go over his disposition options. 11/24 - patient slept 3 hours overnight; he c/o blood in his and on his penis though this was not observed. Patient got no PRNs and is discharge focused; he denies SI/HI/AVH/PI. Disorganization persists. Patient scheduled for discharge on Saturday to [place] via bus. 11/25 - [employer] reports no specific complaints today. He slept very getting about five hours per staff report. There were no behavioral issues noted overnight. He endorses some ongoing low mood. No other new concerns expressed. SIDE EFFECTS: (reviewed/updated 11/25/2021)  None reported or admitted to. ALLERGIES:(reviewed/updated 11/25/2021)  No Known Allergies   MEDICATIONS PRIOR TO ADMISSION:(reviewed/updated 11/25/2021)  No medications prior to admission. PAST MEDICAL HISTORY: Past medical history from the initial psychiatric evaluation has been reviewed (reviewed/updated 11/25/2021) with no additional updates (I asked patient and no additional past medical history provided). History reviewed. No pertinent past medical history. History reviewed. No pertinent surgical history. SOCIAL HISTORY: Social history from the initial psychiatric evaluation has been reviewed (reviewed/updated 11/25/2021) with no additional updates (I asked patient and no additional social history provided).    Social History     Socioeconomic History    Marital status: SINGLE     Spouse name: Not on file    Number of children: Not on file    Years of education: Not on file    Highest education level: Not on file   Occupational History    Not on file   Tobacco Use    Smoking status: Current Some Day Smoker    Smokeless tobacco: Not on file   Substance and Sexual Activity    Alcohol use: Not Currently    Drug use: Not Currently    Sexual activity: Not Currently   Other Topics Concern    Not on file   Social History Narrative    Not on file     Social Determinants of Health     Financial Resource Strain:     Difficulty of Paying Living Expenses: Not on file   Food Insecurity:     Worried About Running Out of Food in the Last Year: Not on file    Ana of Food in the Last Year: Not on file   Transportation Needs:     Lack of Transportation (Medical): Not on file    Lack of Transportation (Non-Medical): Not on file   Physical Activity:     Days of Exercise per Week: Not on file    Minutes of Exercise per Session: Not on file   Stress:     Feeling of Stress : Not on file   Social Connections:     Frequency of Communication with Friends and Family: Not on file    Frequency of Social Gatherings with Friends and Family: Not on file    Attends [employer] Services: Not on file    Active Member of [address] [place] [address] or Organizations: Not on file    Attends Club or Organization Meetings: Not on file    Marital Status: Not on file   Intimate Partner Violence:     Fear of Current or Ex-Partner: Not on file    Emotionally Abused: Not on file    Physically Abused: Not on file    Sexually Abused: Not on file   Housing Stability:     Unable to Pay for Housing in the Last Year: Not on file    Number of [place] in the Last Year: Not on file    Unstable Housing in the Last Year: Not on file      FAMILY HISTORY: Family history from the initial psychiatric evaluation has been reviewed (reviewed/updated 11/25/2021) with no additional updates (I asked patient and no additional family history provided). History reviewed. No pertinent family history.     REVIEW OF SYSTEMS: (reviewed/updated 11/25/2021)  Appetite:no change from normal   Sleep: poor with DIMS (difficulty initiating & maintaining sleep)   All other Review of Systems: Negative except per HPI         [address] EXAM & VITALS     MENTAL STATUS EXAM (MSE):    MSE FINDINGS ARE WITHIN NORMAL LIMITS (WNL) UNLESS OTHERWISE STATED BELOW. ( ALL OF THE BELOW CATEGORIES OF THE MSE HAVE BEEN REVIEWED (reviewed 11/25/2021) AND UPDATED AS DEEMED APPROPRIATE )  General Presentation age appropriate, guarded and uncooperative   Orientation disorganized, oriented to time, place and person   Vital Signs  See below (reviewed 11/25/2021); Vital Signs (BP, Pulse, & Temp) are within normal limits if not listed below. Gait and Station Stable/steady, no ataxia   Musculoskeletal System No extrapyramidal symptoms (EPS); no abnormal muscular movements or Tardive Dyskinesia (TD); muscle strength and tone are within normal limits   Language No aphasia or dysarthria   Speech:  hypoverbal and increased latency of response   Thought Processes illogical; normal rate of thoughts; poor abstract reasoning/computation   Thought Associations loose associations   Thought Content preoccupations and internally preoccupied   Suicidal Ideations none   Homicidal Ideations none   Mood:  hostile  and irritable   Affect:  constricted and increased in intensity   Memory recent  fair   Memory remote:  intact   Concentration/Attention:  intact   Fund of Knowledge average   Insight:  poor   Reliability fair   Judgment:  poor          VITALS:     No data found. Wt Readings from Last 3 Encounters:   11/10/21 90.7 kg (200 lb)     Temp Readings from Last 3 Encounters:   No data found for Temp     BP Readings from Last 3 Encounters:   No data found for BP     Pulse Readings from Last 3 Encounters:   No data found for Pulse            DATA     LABORATORY DATA:(reviewed/updated 11/25/2021)  No results found for this or any previous visit (from the past 24 hour(s)). Lab Results   Component Value Date/Time    Valproic acid 82 11/17/2021 05:46 AM     No results found for: LITHM   RADIOLOGY REPORTS:(reviewed/updated 11/25/2021)  No results found.        MEDICATIONS     ALL MEDICATIONS:   Current Facility-Administered Medications   Medication Dose Route Frequency    traZODone (DESYREL) tablet 100 mg  100 mg Oral QHS    ARIPiprazole lauroxil (ARISTADA) 1,064 mg/3.9 mL ER injection 1,064 mg  1,064 mg IntraMUSCular Q 2 MONTHS    valproic acid (as sodium salt) (DEPAKENE) 250 mg/5 mL (5 mL) oral solution 750 mg  750 mg Oral Q12H    Or    LORazepam (ATIVAN) injection 1 mg +++COURT ORDERED MEDICATION FOR REFUSAL OF VALPROIC ACID+++  1 mg IntraMUSCular Q12H    OLANZapine (ZyPREXA) tablet 5 mg  5 mg Oral Q6H PRN    haloperidol lactate (HALDOL) injection 5 mg  5 mg IntraMUSCular Q6H PRN    benztropine (COGENTIN) tablet 1 mg  1 mg Oral BID PRN    diphenhydrAMINE (BENADRYL) injection 50 mg  50 mg IntraMUSCular BID PRN    hydrOXYzine HCL (ATARAX) tablet 50 mg  50 mg Oral TID PRN    LORazepam (ATIVAN) injection 1 mg  1 mg IntraMUSCular Q4H PRN    acetaminophen (TYLENOL) tablet 650 mg  650 mg Oral Q4H PRN    magnesium hydroxide (MILK OF MAGNESIA) 400 mg/5 mL oral suspension 30 mL  30 mL Oral DAILY PRN      SCHEDULED MEDICATIONS:   Current Facility-Administered Medications   Medication Dose Route Frequency    traZODone (DESYREL) tablet 100 mg  100 mg Oral QHS    ARIPiprazole lauroxil (ARISTADA) 1,064 mg/3.9 mL ER injection 1,064 mg  1,064 mg IntraMUSCular Q 2 MONTHS    valproic acid (as sodium salt) (DEPAKENE) 250 mg/5 mL (5 mL) oral solution 750 mg  750 mg Oral Q12H    Or    LORazepam (ATIVAN) injection 1 mg +++COURT ORDERED MEDICATION FOR REFUSAL OF VALPROIC ACID+++  1 mg IntraMUSCular Q12H          ASSESSMENT & PLAN     DIAGNOSES REQUIRING ACTIVE TREATMENT AND MONITORING: (reviewed/updated 11/25/2021)  Patient Active Hospital Problem List:   Assessment: patient with decompensation in the field secondary to medication non-compliance. He is significantly impaired and will require stabilization with typical eliseo agents.     Plan:  COURT ORDERED MEDICATIONS:    Aristada CHOU 1064 mg [id number] for psychosis CHOU (next due 01/22/21)   Depakote oral soln 750 mg *OR* Ativan 1 mg Q12H for eliseo   VPA level 82 on 11/17  - CONTINUE Trazodone 100 mg PO QHS for insomnia  - f/u Urinalysis  - IGM therapy as tolerated  - Expand database / obtain collateral  - Dispo planning (home when stable)     I will continue to monitor blood levels (valproic acid---a drug with a narrow therapeutic index= NTI) and associated labs for drug therapy implemented that require intense monitoring for toxicity as deemed appropriate based on current medication side effects and pharmacodynamically determined drug 1/2 lives. In summary, Juan Renee, is a 27 y.o.  male who presents with a severe exacerbation of the principal diagnosis of Bipolar disorder, current episode manic severe with psychotic features ([facility] [address]    Patient's condition is improving. Patient requires continued inpatient hospitalization for further stabilization, safety monitoring and medication management. I will continue to coordinate the provision of individual, milieu, occupational, group, and substance abuse therapies to address target symptoms/diagnoses as deemed appropriate for the individual patient. A coordinated, multidisplinary treatment team round was conducted with the patient (this team consists of the nurse, psychiatric unit pharmacist,  and writer). Complete current electronic health record for patient has been reviewed today including consultant notes, ancillary staff notes, nurses and psychiatric tech notes. Suicide risk assessment completed and patient deemed to be of low risk for suicide at this time. The following regarding medications was addressed during rounds with patient:   the risks and benefits of the proposed medication. The patient was given the opportunity to ask questions. Informed consent given to the use of the above medications.  Will continue to adjust psychiatric and non-psychiatric medications (see above \"medication\" section and orders section for details) as deemed appropriate & based upon diagnoses and response to treatment. I will continue to order blood tests/labs and diagnostic tests as deemed appropriate and review results as they become available (see orders for details and above listed lab/test results). I will order psychiatric records from previous [facility] hospitals to further elucidate the nature of patient's psychopathology and review once available. I will gather additional collateral information from friends, family and o/p treatment team to further elucidate the nature of patient's psychopathology and baselline level of psychiatric functioning. I certify that this patient's inpatient psychiatric hospital services furnished since the previous certification were, and continue to be, required for treatment that could reasonably be expected to improve the patient's condition, or for diagnostic study, and that the patient continues to need, on a daily basis, active treatment furnished directly by or requiring the supervision of inpatient psychiatric facility personnel. In addition, the hospital records show that services furnished were intensive treatment services, admission or related services, or equivalent services.     EXPECTED DISCHARGE DATE/DAY: TBD     DISPOSITION: Home       Signed By:   Laila Acosta MD  11/25/2021

## 2021-11-25 NOTE — BH NOTES
PSYCHIATRIC PROGRESS NOTE         Patient Name  Feim Solo   Date of Birth 1990   [facility] [id number]   Medical Record Number  [id number]      Age  27 y.o. PCP None   Admit date:  11/9/2021    Room Number  625/69  @ [facility]   Date of Service  11/24/2021         E & M PROGRESS NOTE:         HISTORY       CC:  \"psychosis\"  HISTORY OF PRESENT ILLNESS/INTERVAL HISTORY:  (reviewed/updated 11/24/2021). per initial evaluation: The patient, Femi Solo, is a 27 y.o. BLACK/ male with a past psychiatric history significant for schizoaffective vs bipolar disorder, who presents at this time with complaints of (and/or evidence of) the following emotional symptoms: psychotic behavior and eliseo. Additional symptomatology include poor self care. The above symptoms have been present for 2+ weeks. These symptoms are of moderate to high severity. These symptoms are constant in nature. The patient's condition has been precipitated by psychosocial stressors. Patient's condition made worse by treatment noncompliance. UDS: negative; BAL=0.     Per admission documentation, the patient was picked up by police following suspicious car report; he had run out of gas en route to [place] and was unable to fully explain his situation. Patient has been guarded and uncooperative on the unit, pacing the hallway and responding to internal stimuli. Patient grossly incoherent on interview, dismissive and suspicious.       The patient is a poor and uncooperative historian. The patient corroborates the above narrative. The patient contracts for safety on the unit and gives consent for the team to contact collateral. The patient is not amenable to initiating treatment while on the unit. Patient states he ran out of gas and offers little information regarding how he came to be in [facility] or why he was traveling to [place] from his native [place] -- he states he lives in [place] though this is not corroborated.  Patient ends interview abruptly and continues to pace the unit. Per collateral, patient has not been compliant with medications in the past and has had prior episodes of psychosis. 11/11 - the patient slept 4 hours overnight. He remains labile and disorganized, with inappropriate actions such as masturbating at he nurses' station. Patient refused medications and labwork, still pacing the unit with an odd affect. No PRNs given as patient is verbally redirectable, no physical aggression. Court order pending for 11/12 for forced medication during TDO hearing. 11/12 - patient remains uncooperative with treatment; he is bizarre and illogical on interview, still stating his family is dead. He slept 6.5 hours, refused all assessments, vitals and medications. Patient irritable, wants to leave the hospital today. Per family, he has the keys to his mother's car and will not return them (per SW, the keys cannot be taken from the hospital unless he releases his personal property to them). 11/15 - no significant improvements noted. Patient slept 5 hours overnight, he remains disorganized, guarded, states his family is 'dead' and refuses all assessments. Patient got no PRNs for agitation as verbal redirection has been successful. Patient discharge focused, unable to provide any meaningful information about his admission but has been refusing the team's offer to contact his family for further treatment planning. Patient compliant with court ordered medication. 11/16 - no acute overnight events. Patient slept 7.75 hours overnight. He is compliant with court ordered medication and denies SI/HI/AVH; patient remains guarded and suspicious about staff and will not speak with his family despite encouragement from team. He is isolative to room today and discharge focused. No agitation PRNs given. 11/17 - the patient has been isolative, disorganized but coherent, refusing vital signs but compliant with court ordered medication.  He is less internally preoccupied, discharge focused, continues to refuse any communication with his family, whom he states do not exist or have  previously. Of note, the patient lists his mother's name as 'Kamari,' and states he last saw her in , then 0. He is unable to reality test, repeatedly states he will be continuing on to [place] to meet his gf. VPA level 82.     - patient still pacing the hallway overnight but has been more cooperative this morning and willing to have team reach out to his family. Patient calm, took court ordered medications but remains irritable, discharge focused. Per family collateral, patient is still far from his baseline and this is his 4th episode this year. Family session for tomorrow to go over goals of care.  - overnight, the patient slept 2 hours. He is pacing but verbally redirectable. Compliant with PO medications. Patient discharge focused, he denies SI/PI/AVH/PI but remains disorganized. Family mtg this afternoon to discuss goals of care.  - Annette Ordoñez reports feeling well and inquired when he could go home. Moods are good. Denies SI/HI/AH/VH. No aggression or violence. Quiet and isolative per staff. Appropriately interactive and aware. Tolerating medications well. Eating and sleeping poorly (4hrs).  - Annette Ordoñez reports feeling the same as yesterday. Moods are good. Denies SI/HI/AH/VH. No aggression or violence. Appropriately interactive and aware. Tolerating medications well. Eating and sleeping poorly at 4.5 hrs.     - patient slept 4.5 hours total. Patient noted to be responding to internal stimuli. No PRNs given. Patient refused vital signs this morning, he is discharge focused. Patient denies SI/HI/AVH/PI; he states his family wants him home as soon as possible though per brother this is not the case. No acute overnight events.  - overnight, patient slept 4 hours.  He is discharge focused, printing out bus schedules and stating his family is hoping for him to return to [place] today (per , this is not the case). Patient denies SI/HI/AVH/PI. He is guarded per nursing staff, allowed vital signs this morning, and appeared suspicious. Patient requesting another family session to go over his disposition options. 11/24 - patient slept 3 hours overnight; he c/o blood in his and on his penis though this was not observed. Patient got no PRNs and is discharge focused; he denies SI/HI/AVH/PI. Disorganization persists. Patient scheduled for discharge on Saturday to [place] via bus. SIDE EFFECTS: (reviewed/updated 11/24/2021)  None reported or admitted to. ALLERGIES:(reviewed/updated 11/24/2021)  No Known Allergies   MEDICATIONS PRIOR TO ADMISSION:(reviewed/updated 11/24/2021)  No medications prior to admission. PAST MEDICAL HISTORY: Past medical history from the initial psychiatric evaluation has been reviewed (reviewed/updated 11/24/2021) with no additional updates (I asked patient and no additional past medical history provided). History reviewed. No pertinent past medical history. History reviewed. No pertinent surgical history. SOCIAL HISTORY: Social history from the initial psychiatric evaluation has been reviewed (reviewed/updated 11/24/2021) with no additional updates (I asked patient and no additional social history provided).    Social History     Socioeconomic History    Marital status: SINGLE     Spouse name: Not on file    Number of children: Not on file    Years of education: Not on file    Highest education level: Not on file   Occupational History    Not on file   Tobacco Use    Smoking status: Current Some Day Smoker    Smokeless tobacco: Not on file   Substance and Sexual Activity    Alcohol use: Not Currently    Drug use: Not Currently    Sexual activity: Not Currently   Other Topics Concern    Not on file   Social History Narrative    Not on file     Social Determinants of Health Financial Resource Strain:     Difficulty of Paying Living Expenses: Not on file   Food Insecurity:     Worried About Running Out of Food in the Last Year: Not on file    Ana of Food in the Last Year: Not on file   Transportation Needs:     Lack of Transportation (Medical): Not on file    Lack of Transportation (Non-Medical): Not on file   Physical Activity:     Days of Exercise per Week: Not on file    Minutes of Exercise per Session: Not on file   Stress:     Feeling of Stress : Not on file   Social Connections:     Frequency of Communication with Friends and Family: Not on file    Frequency of Social Gatherings with Friends and Family: Not on file    Attends Restorationist Services: Not on file    Active Member of [address] [place] [address] [employer] or Organizations: Not on file    Attends Club or Organization Meetings: Not on file    Marital Status: Not on file   Intimate Partner Violence:     Fear of Current or Ex-Partner: Not on file    Emotionally Abused: Not on file    Physically Abused: Not on file    Sexually Abused: Not on file   Housing Stability:     Unable to Pay for Housing in the Last Year: Not on file    Number of [place] in the Last Year: Not on file    Unstable Housing in the Last Year: Not on file      FAMILY HISTORY: Family history from the initial psychiatric evaluation has been reviewed (reviewed/updated 11/24/2021) with no additional updates (I asked patient and no additional family history provided). History reviewed. No pertinent family history.     REVIEW OF SYSTEMS: (reviewed/updated 11/24/2021)  Appetite:no change from normal   Sleep: poor with DIMS (difficulty initiating & maintaining sleep)   All other Review of Systems: Negative except per HPI         [facility] (MSE):    MSE FINDINGS ARE WITHIN NORMAL LIMITS (WNL) UNLESS OTHERWISE STATED BELOW. ( ALL OF THE BELOW CATEGORIES OF THE MSE HAVE BEEN REVIEWED (reviewed 11/24/2021) AND UPDATED AS DEEMED APPROPRIATE )  General Presentation age appropriate, guarded and uncooperative   Orientation disorganized, oriented to time, place and person   Vital Signs  See below (reviewed 11/24/2021); Vital Signs (BP, Pulse, & Temp) are within normal limits if not listed below. Gait and Station Stable/steady, no ataxia   Musculoskeletal System No extrapyramidal symptoms (EPS); no abnormal muscular movements or Tardive Dyskinesia (TD); muscle strength and tone are within normal limits   Language No aphasia or dysarthria   Speech:  hypoverbal and increased latency of response   Thought Processes illogical; normal rate of thoughts; poor abstract reasoning/computation   Thought Associations loose associations   Thought Content preoccupations and internally preoccupied   Suicidal Ideations none   Homicidal Ideations none   Mood:  hostile  and irritable   Affect:  constricted and increased in intensity   Memory recent  fair   Memory remote:  intact   Concentration/Attention:  intact   Fund of Knowledge average   Insight:  poor   Reliability fair   Judgment:  poor          VITALS:     No data found. Wt Readings from Last 3 Encounters:   11/10/21 90.7 kg (200 lb)     Temp Readings from Last 3 Encounters:   No data found for Temp     BP Readings from Last 3 Encounters:   11/23/21 124/74     Pulse Readings from Last 3 Encounters:   11/23/21 87            DATA     LABORATORY DATA:(reviewed/updated 11/24/2021)  No results found for this or any previous visit (from the past 24 hour(s)). Lab Results   Component Value Date/Time    Valproic acid 82 11/17/2021 05:46 AM     No results found for: LITHM   RADIOLOGY REPORTS:(reviewed/updated 11/24/2021)  No results found.        MEDICATIONS     ALL MEDICATIONS:   Current Facility-Administered Medications   Medication Dose Route Frequency    traZODone (DESYREL) tablet 100 mg  100 mg Oral QHS    ARIPiprazole lauroxil (ARISTADA) 1,064 mg/3.9 mL ER injection 1,064 mg  1,064 mg IntraMUSCular Q 2 MONTHS    valproic acid (as sodium salt) (DEPAKENE) 250 mg/5 mL (5 mL) oral solution 750 mg  750 mg Oral Q12H    Or    LORazepam (ATIVAN) injection 1 mg +++COURT ORDERED MEDICATION FOR REFUSAL OF VALPROIC ACID+++  1 mg IntraMUSCular Q12H    OLANZapine (ZyPREXA) tablet 5 mg  5 mg Oral Q6H PRN    haloperidol lactate (HALDOL) injection 5 mg  5 mg IntraMUSCular Q6H PRN    benztropine (COGENTIN) tablet 1 mg  1 mg Oral BID PRN    diphenhydrAMINE (BENADRYL) injection 50 mg  50 mg IntraMUSCular BID PRN    hydrOXYzine HCL (ATARAX) tablet 50 mg  50 mg Oral TID PRN    LORazepam (ATIVAN) injection 1 mg  1 mg IntraMUSCular Q4H PRN    acetaminophen (TYLENOL) tablet 650 mg  650 mg Oral Q4H PRN    magnesium hydroxide (MILK OF MAGNESIA) 400 mg/5 mL oral suspension 30 mL  30 mL Oral DAILY PRN      SCHEDULED MEDICATIONS:   Current Facility-Administered Medications   Medication Dose Route Frequency    traZODone (DESYREL) tablet 100 mg  100 mg Oral QHS    ARIPiprazole lauroxil (ARISTADA) 1,064 mg/3.9 mL ER injection 1,064 mg  1,064 mg IntraMUSCular Q 2 MONTHS    valproic acid (as sodium salt) (DEPAKENE) 250 mg/5 mL (5 mL) oral solution 750 mg  750 mg Oral Q12H    Or    LORazepam (ATIVAN) injection 1 mg +++COURT ORDERED MEDICATION FOR REFUSAL OF VALPROIC ACID+++  1 mg IntraMUSCular Q12H          ASSESSMENT & PLAN     DIAGNOSES REQUIRING ACTIVE TREATMENT AND MONITORING: (reviewed/updated 11/24/2021)  Patient Active Hospital Problem List:   Assessment: patient with decompensation in the field secondary to medication non-compliance. He is significantly impaired and will require stabilization with typical eliseo agents.     Plan:  COURT ORDERED MEDICATIONS:    Aristada CHOU 1064 mg [id number] for psychosis CHOU (next due 01/22/21)   Depakote oral soln 750 mg *OR* Ativan 1 mg Q12H for eliseo   VPA level 82 on 11/17  - CONTINUE Trazodone 100 mg PO QHS for insomnia  - f/u Urinalysis  - IGM therapy as tolerated  - Expand database / obtain collateral  - Dispo planning (home when stable)     I will continue to monitor blood levels (valproic acid---a drug with a narrow therapeutic index= NTI) and associated labs for drug therapy implemented that require intense monitoring for toxicity as deemed appropriate based on current medication side effects and pharmacodynamically determined drug 1/2 lives. In summary, Jame Cruz, is a 27 y.o.  male who presents with a severe exacerbation of the principal diagnosis of Bipolar disorder, current episode manic severe with psychotic features ([facility] [place] [address]    Patient's condition is improving. Patient requires continued inpatient hospitalization for further stabilization, safety monitoring and medication management. I will continue to coordinate the provision of individual, milieu, occupational, group, and substance abuse therapies to address target symptoms/diagnoses as deemed appropriate for the individual patient. A coordinated, multidisplinary treatment team round was conducted with the patient (this team consists of the nurse, psychiatric unit pharmacist,  and writer). Complete current electronic health record for patient has been reviewed today including consultant notes, ancillary staff notes, nurses and psychiatric tech notes. Suicide risk assessment completed and patient deemed to be of low risk for suicide at this time. The following regarding medications was addressed during rounds with patient:   the risks and benefits of the proposed medication. The patient was given the opportunity to ask questions. Informed consent given to the use of the above medications. Will continue to adjust psychiatric and non-psychiatric medications (see above \"medication\" section and orders section for details) as deemed appropriate & based upon diagnoses and response to treatment.      I will continue to order blood tests/labs and diagnostic tests as deemed appropriate and review results as they become available (see orders for details and above listed lab/test results). I will order psychiatric records from previous [facility] hospitals to further elucidate the nature of patient's psychopathology and review once available. I will gather additional collateral information from friends, family and o/p treatment team to further elucidate the nature of patient's psychopathology and baselline level of psychiatric functioning. I certify that this patient's inpatient psychiatric hospital services furnished since the previous certification were, and continue to be, required for treatment that could reasonably be expected to improve the patient's condition, or for diagnostic study, and that the patient continues to need, on a daily basis, active treatment furnished directly by or requiring the supervision of inpatient psychiatric facility personnel. In addition, the hospital records show that services furnished were intensive treatment services, admission or related services, or equivalent services.     EXPECTED DISCHARGE DATE/DAY: TBD     DISPOSITION: Home       Signed By:   Ashley Cameron MD  11/24/2021

## 2021-11-25 NOTE — PROGRESS NOTES
Problem: Psychosis  Goal: *STG: Decreased hallucinations  Outcome: Progressing Towards Goal  Goal: *STG: Remains safe in hospital  Outcome: Progressing Towards Goal       0730 Pt received in room. Pt denies si/hi/ah/vh. Pt reports going to groups and eating outside in the dayroom. Pt continues to refuse vital signs and pace the hallway throughout the shift. Anxiety=0/10 depression= 0/10. Pt is Aox4. Pt is calm and cooperative. Med and meal compliant. Pain level of 0/10 . Will continue to monitor for any changes.

## 2021-11-26 VITALS
RESPIRATION RATE: 18 BRPM | BODY MASS INDEX: 27.09 KG/M2 | OXYGEN SATURATION: 96 % | TEMPERATURE: 98.3 F | SYSTOLIC BLOOD PRESSURE: 124 MMHG | HEART RATE: 87 BPM | HEIGHT: 72 IN | DIASTOLIC BLOOD PRESSURE: 74 MMHG | WEIGHT: 200 LBS

## 2021-11-26 PROCEDURE — 74011250637 HC RX REV CODE- 250/637: Performed by: PSYCHIATRY & NEUROLOGY

## 2021-11-26 PROCEDURE — 65220000003 HC RM SEMIPRIVATE PSYCH

## 2021-11-26 RX ORDER — DIVALPROEX SODIUM 250 MG/1
750 TABLET, DELAYED RELEASE ORAL 2 TIMES DAILY
Qty: 180 TABLET | Refills: 0 | Status: SHIPPED | OUTPATIENT
Start: 2021-11-26 | End: 2021-12-26

## 2021-11-26 RX ORDER — TRAZODONE HYDROCHLORIDE 100 MG/1
100 TABLET ORAL
Qty: 15 TABLET | Refills: 0 | Status: SHIPPED | OUTPATIENT
Start: 2021-11-26

## 2021-11-26 RX ADMIN — VALPROIC ACID 750 MG: 250 SOLUTION ORAL at 21:24

## 2021-11-26 RX ADMIN — TRAZODONE HYDROCHLORIDE 100 MG: 100 TABLET ORAL at 21:24

## 2021-11-26 RX ADMIN — VALPROIC ACID 750 MG: 250 SOLUTION ORAL at 08:55

## 2021-11-26 NOTE — SUICIDE SAFETY PLAN
SAFETY PLAN    A suicide Safety Plan is a document that supports someone when they are having thoughts of suicide. Warning Signs that indicate a suicidal crisis may be developing: What (situations, thoughts, feelings, body sensations, behaviors, etc.) do you experience that lets you know you are beginning to think about suicide? 1. headache  2. Strong thoughts and concerns  3. Bad luck    Internal Coping Strategies:  What things can I do (relaxation techniques, hobbies, physical activities, etc.) to take my mind off my problems without contacting another person? 1. Walk/pacing  2. sleeping  3. hobbies    People and social settings that provide distraction: Who can I call or where can I go to distract me? 1. Name: joseph De Leon  Phone: in phone  2. Name: Merlin Dobbins  Phone: [phone]   3. Place: house            4. Place: park    People whom I can ask for help: Who can I call when I need help - for example, friends, family, clergy, someone else? 1. Name: joseph De Leon                Phone: in phone  2. Name: Renato De La Fuente  Phone: [phone]  3. Name: brother- Ed  Phone: in phone    Professionals or [address] [place] [address] I can contact during a crisis: Who can I call for help - for example, my doctor, my psychiatrist, my psychologist, a mental health provider, a suicide hotline? 1. Clinician Name: [facility]   Phone: [phone]      Clinician Pager or Emergency Contact #: [phone]    2. Clinician Name:    Phone:       Clinician Pager or Emergency Contact #:     3. Suicide Prevention Lifeline: 3-905-383-TALK (2212)    4. [address] [place] [address] Emergency Services -  for example, [facility], [facility] suicide hotline, [facility] Hotline: [facility]      Emergency Services Address: [address], [address] [place] [address]      Emergency Services Phone: [phone]    Making the environment safe:  How can I make my environment (house/apartment/living space) safer? For example, can I remove guns, medications, and other items? 1. I feel safe  2.  No guns or weapons

## 2021-11-26 NOTE — BH NOTES
PSYCHIATRIC PROGRESS NOTE         Patient Name  Myrtle Robles   Date of Birth 1990   [facility] [id number]   Medical Record Number  [id number]      Age  27 y.o. PCP None   Admit date:  11/9/2021    Room Number  863/51  @ [facility]   Date of Service  11/26/2021         E & M PROGRESS NOTE:         HISTORY       CC:  \"psychosis\"  HISTORY OF PRESENT ILLNESS/INTERVAL HISTORY:  (reviewed/updated 11/26/2021). per initial evaluation: The patient, Myrtle Robles, is a 27 y.o. BLACK/ male with a past psychiatric history significant for schizoaffective vs bipolar disorder, who presents at this time with complaints of (and/or evidence of) the following emotional symptoms: psychotic behavior and eliseo. Additional symptomatology include poor self care. The above symptoms have been present for 2+ weeks. These symptoms are of moderate to high severity. These symptoms are constant in nature. The patient's condition has been precipitated by psychosocial stressors. Patient's condition made worse by treatment noncompliance. UDS: negative; BAL=0.     Per admission documentation, the patient was picked up by police following suspicious car report; he had run out of gas en route to [place] and was unable to fully explain his situation. Patient has been guarded and uncooperative on the unit, pacing the hallway and responding to internal stimuli. Patient grossly incoherent on interview, dismissive and suspicious.       The patient is a poor and uncooperative historian. The patient corroborates the above narrative. The patient contracts for safety on the unit and gives consent for the team to contact collateral. The patient is not amenable to initiating treatment while on the unit. Patient states he ran out of gas and offers little information regarding how he came to be in [facility] or why he was traveling to [place] from his native [place] -- he states he lives in [place] though this is not corroborated.  Patient ends interview abruptly and continues to pace the unit. Per collateral, patient has not been compliant with medications in the past and has had prior episodes of psychosis. 11/11 - the patient slept 4 hours overnight. He remains labile and disorganized, with inappropriate actions such as masturbating at he nurses' station. Patient refused medications and labwork, still pacing the unit with an odd affect. No PRNs given as patient is verbally redirectable, no physical aggression. Court order pending for 11/12 for forced medication during TDO hearing. 11/12 - patient remains uncooperative with treatment; he is bizarre and illogical on interview, still stating his family is dead. He slept 6.5 hours, refused all assessments, vitals and medications. Patient irritable, wants to leave the hospital today. Per family, he has the keys to his mother's car and will not return them (per SW, the keys cannot be taken from the hospital unless he releases his personal property to them). 11/15 - no significant improvements noted. Patient slept 5 hours overnight, he remains disorganized, guarded, states his family is 'dead' and refuses all assessments. Patient got no PRNs for agitation as verbal redirection has been successful. Patient discharge focused, unable to provide any meaningful information about his admission but has been refusing the team's offer to contact his family for further treatment planning. Patient compliant with court ordered medication. 11/16 - no acute overnight events. Patient slept 7.75 hours overnight. He is compliant with court ordered medication and denies SI/HI/AVH; patient remains guarded and suspicious about staff and will not speak with his family despite encouragement from team. He is isolative to room today and discharge focused. No agitation PRNs given. 11/17 - the patient has been isolative, disorganized but coherent, refusing vital signs but compliant with court ordered medication.  He is less internally preoccupied, discharge focused, continues to refuse any communication with his family, whom he states do not exist or have  previously. Of note, the patient lists his mother's name as 'Kamari,' and states he last saw her in , then 0. He is unable to reality test, repeatedly states he will be continuing on to [place] to meet his gf. VPA level 82.     - patient still pacing the hallway overnight but has been more cooperative this morning and willing to have team reach out to his family. Patient calm, took court ordered medications but remains irritable, discharge focused. Per family collateral, patient is still far from his baseline and this is his 4th episode this year. Family session for tomorrow to go over goals of care.  - overnight, the patient slept 2 hours. He is pacing but verbally redirectable. Compliant with PO medications. Patient discharge focused, he denies SI/PI/AVH/PI but remains disorganized. Family mtg this afternoon to discuss goals of care.  - Garrison Gonzalez reports feeling well and inquired when he could go home. Moods are good. Denies SI/HI/AH/VH. No aggression or violence. Quiet and isolative per staff. Appropriately interactive and aware. Tolerating medications well. Eating and sleeping poorly (4hrs).  - Garrison Gonzalez reports feeling the same as yesterday. Moods are good. Denies SI/HI/AH/VH. No aggression or violence. Appropriately interactive and aware. Tolerating medications well. Eating and sleeping poorly at 4.5 hrs.     - patient slept 4.5 hours total. Patient noted to be responding to internal stimuli. No PRNs given. Patient refused vital signs this morning, he is discharge focused. Patient denies SI/HI/AVH/PI; he states his family wants him home as soon as possible though per brother this is not the case. No acute overnight events.  - overnight, patient slept 4 hours.  He is discharge focused, printing out bus schedules and stating his family is hoping for him to return to [place] today (per , this is not the case). Patient denies SI/HI/AVH/PI. He is guarded per nursing staff, allowed vital signs this morning, and appeared suspicious. Patient requesting another family session to go over his disposition options. 11/24 - patient slept 3 hours overnight; he c/o blood in his and on his penis though this was not observed. Patient got no PRNs and is discharge focused; he denies SI/HI/AVH/PI. Disorganization persists. Patient scheduled for discharge on Saturday to [place] via bus. 11/25 - [employer] reports no specific complaints today. He slept very getting about five hours per staff report. There were no behavioral issues noted overnight. He endorses some ongoing low mood. No other new concerns expressed. 11/26- Holiday Coverage: the patient reports feeling well today. He denies any side effects to medications. We discussed the plan for discharge tomorrow and he is agreeable. Prescriptions were sent to his preferred pharmacy. No suicidal or homicidal thoughts today. No psychotic symptoms or paranoid ideation noted on interview. SIDE EFFECTS: (reviewed/updated 11/26/2021)  None reported or admitted to. ALLERGIES:(reviewed/updated 11/26/2021)  No Known Allergies   MEDICATIONS PRIOR TO ADMISSION:(reviewed/updated 11/26/2021)  No medications prior to admission. PAST MEDICAL HISTORY: Past medical history from the initial psychiatric evaluation has been reviewed (reviewed/updated 11/26/2021) with no additional updates (I asked patient and no additional past medical history provided). History reviewed. No pertinent past medical history. History reviewed. No pertinent surgical history. SOCIAL HISTORY: Social history from the initial psychiatric evaluation has been reviewed (reviewed/updated 11/26/2021) with no additional updates (I asked patient and no additional social history provided).    Social History Socioeconomic History    Marital status: SINGLE     Spouse name: Not on file    Number of children: Not on file    Years of education: Not on file    Highest education level: Not on file   Occupational History    Not on file   Tobacco Use    Smoking status: Current Some Day Smoker    Smokeless tobacco: Not on file   Substance and Sexual Activity    Alcohol use: Not Currently    Drug use: Not Currently    Sexual activity: Not Currently   Other Topics Concern    Not on file   Social History Narrative    Not on file     Social Determinants of Health     Financial Resource Strain:     Difficulty of Paying Living Expenses: Not on file   Food Insecurity:     Worried About Running Out of Food in the Last Year: Not on file    Ana of Food in the Last Year: Not on file   Transportation Needs:     Lack of Transportation (Medical): Not on file    Lack of Transportation (Non-Medical):  Not on file   Physical Activity:     Days of Exercise per Week: Not on file    Minutes of Exercise per Session: Not on file   Stress:     Feeling of Stress : Not on file   Social Connections:     Frequency of Communication with Friends and Family: Not on file    Frequency of Social Gatherings with Friends and Family: Not on file    Attends Islam Services: Not on file    Active Member of [address] [place] [address] [employer] or Organizations: Not on file    Attends Club or Organization Meetings: Not on file    Marital Status: Not on file   Intimate Partner Violence:     Fear of Current or Ex-Partner: Not on file    Emotionally Abused: Not on file    Physically Abused: Not on file    Sexually Abused: Not on file   Housing Stability:     Unable to Pay for Housing in the Last Year: Not on file    Number of [place] in the Last Year: Not on file    Unstable Housing in the Last Year: Not on file      FAMILY HISTORY: Family history from the initial psychiatric evaluation has been reviewed (reviewed/updated 11/26/2021) with no additional updates (I asked patient and no additional family history provided). History reviewed. No pertinent family history. REVIEW OF SYSTEMS: (reviewed/updated 11/26/2021)  Appetite:no change from normal   Sleep: poor with DIMS (difficulty initiating & maintaining sleep)   All other Review of Systems: Negative except per HPI         [facility] (MSE):    MSE FINDINGS ARE WITHIN NORMAL LIMITS (WNL) UNLESS OTHERWISE STATED BELOW. ( ALL OF THE BELOW CATEGORIES OF THE MSE HAVE BEEN REVIEWED (reviewed 11/26/2021) AND UPDATED AS DEEMED APPROPRIATE )  General Presentation age appropriate, guarded and uncooperative   Orientation disorganized, oriented to time, place and person   Vital Signs  See below (reviewed 11/26/2021); Vital Signs (BP, Pulse, & Temp) are within normal limits if not listed below. Gait and Station Stable/steady, no ataxia   Musculoskeletal System No extrapyramidal symptoms (EPS); no abnormal muscular movements or Tardive Dyskinesia (TD); muscle strength and tone are within normal limits   Language No aphasia or dysarthria   Speech:  hypoverbal and increased latency of response   Thought Processes illogical; normal rate of thoughts; poor abstract reasoning/computation   Thought Associations loose associations   Thought Content preoccupations and internally preoccupied   Suicidal Ideations none   Homicidal Ideations none   Mood:  hostile  and irritable   Affect:  constricted and increased in intensity   Memory recent  fair   Memory remote:  intact   Concentration/Attention:  intact   Fund of Knowledge average   Insight:  poor   Reliability fair   Judgment:  poor          VITALS:     No data found.   Wt Readings from Last 3 Encounters:   11/10/21 90.7 kg (200 lb)     Temp Readings from Last 3 Encounters:   No data found for Temp     BP Readings from Last 3 Encounters:   No data found for BP     Pulse Readings from Last 3 Encounters:   No data found for Pulse DATA     LABORATORY DATA:(reviewed/updated 11/26/2021)  No results found for this or any previous visit (from the past 24 hour(s)). Lab Results   Component Value Date/Time    Valproic acid 82 11/17/2021 05:46 AM     No results found for: PRECIOUS   RADIOLOGY REPORTS:(reviewed/updated 11/26/2021)  No results found.        MEDICATIONS     ALL MEDICATIONS:   Current Facility-Administered Medications   Medication Dose Route Frequency    traZODone (DESYREL) tablet 100 mg  100 mg Oral QHS    ARIPiprazole lauroxil (ARISTADA) 1,064 mg/3.9 mL ER injection 1,064 mg  1,064 mg IntraMUSCular Q 2 MONTHS    valproic acid (as sodium salt) (DEPAKENE) 250 mg/5 mL (5 mL) oral solution 750 mg  750 mg Oral Q12H    Or    LORazepam (ATIVAN) injection 1 mg +++COURT ORDERED MEDICATION FOR REFUSAL OF VALPROIC ACID+++  1 mg IntraMUSCular Q12H    OLANZapine (ZyPREXA) tablet 5 mg  5 mg Oral Q6H PRN    haloperidol lactate (HALDOL) injection 5 mg  5 mg IntraMUSCular Q6H PRN    benztropine (COGENTIN) tablet 1 mg  1 mg Oral BID PRN    diphenhydrAMINE (BENADRYL) injection 50 mg  50 mg IntraMUSCular BID PRN    hydrOXYzine HCL (ATARAX) tablet 50 mg  50 mg Oral TID PRN    LORazepam (ATIVAN) injection 1 mg  1 mg IntraMUSCular Q4H PRN    acetaminophen (TYLENOL) tablet 650 mg  650 mg Oral Q4H PRN    magnesium hydroxide (MILK OF MAGNESIA) 400 mg/5 mL oral suspension 30 mL  30 mL Oral DAILY PRN      SCHEDULED MEDICATIONS:   Current Facility-Administered Medications   Medication Dose Route Frequency    traZODone (DESYREL) tablet 100 mg  100 mg Oral QHS    ARIPiprazole lauroxil (ARISTADA) 1,064 mg/3.9 mL ER injection 1,064 mg  1,064 mg IntraMUSCular Q 2 MONTHS    valproic acid (as sodium salt) (DEPAKENE) 250 mg/5 mL (5 mL) oral solution 750 mg  750 mg Oral Q12H    Or    LORazepam (ATIVAN) injection 1 mg +++COURT ORDERED MEDICATION FOR REFUSAL OF VALPROIC ACID+++  1 mg IntraMUSCular Q12H          ASSESSMENT & PLAN     DIAGNOSES REQUIRING ACTIVE TREATMENT AND MONITORING: (reviewed/updated 11/26/2021)  Patient Active Hospital Problem List:   Assessment: patient with decompensation in the field secondary to medication non-compliance. He is significantly impaired and will require stabilization with typical eliseo agents. Plan:  COURT ORDERED MEDICATIONS:    Aristada CHOU 1064 mg [id number] for psychosis CHOU (next due 01/22/21)   Depakote oral soln 750 mg *OR* Ativan 1 mg Q12H for eliseo   VPA level 82 on 11/17  - CONTINUE Trazodone 100 mg PO QHS for insomnia  - f/u Urinalysis  - IGM therapy as tolerated  - Expand database / obtain collateral  - Dispo planning (home when stable)     I will continue to monitor blood levels (valproic acid---a drug with a narrow therapeutic index= NTI) and associated labs for drug therapy implemented that require intense monitoring for toxicity as deemed appropriate based on current medication side effects and pharmacodynamically determined drug 1/2 lives. In summary, Bautista Lau, is a 27 y.o.  male who presents with a severe exacerbation of the principal diagnosis of Bipolar disorder, current episode manic severe with psychotic features ([facility] [address]    Patient's condition is improving. Patient requires continued inpatient hospitalization for further stabilization, safety monitoring and medication management. I will continue to coordinate the provision of individual, milieu, occupational, group, and substance abuse therapies to address target symptoms/diagnoses as deemed appropriate for the individual patient. A coordinated, multidisplinary treatment team round was conducted with the patient (this team consists of the nurse, psychiatric unit pharmacist,  and writer). Complete current electronic health record for patient has been reviewed today including consultant notes, ancillary staff notes, nurses and psychiatric tech notes.     Suicide risk assessment completed and patient deemed to be of low risk for suicide at this time. The following regarding medications was addressed during rounds with patient:   the risks and benefits of the proposed medication. The patient was given the opportunity to ask questions. Informed consent given to the use of the above medications. Will continue to adjust psychiatric and non-psychiatric medications (see above \"medication\" section and orders section for details) as deemed appropriate & based upon diagnoses and response to treatment. I will continue to order blood tests/labs and diagnostic tests as deemed appropriate and review results as they become available (see orders for details and above listed lab/test results). I will order psychiatric records from previous [facility] hospitals to further elucidate the nature of patient's psychopathology and review once available. I will gather additional collateral information from friends, family and o/p treatment team to further elucidate the nature of patient's psychopathology and baselline level of psychiatric functioning. I certify that this patient's inpatient psychiatric hospital services furnished since the previous certification were, and continue to be, required for treatment that could reasonably be expected to improve the patient's condition, or for diagnostic study, and that the patient continues to need, on a daily basis, active treatment furnished directly by or requiring the supervision of inpatient psychiatric facility personnel. In addition, the hospital records show that services furnished were intensive treatment services, admission or related services, or equivalent services.     EXPECTED DISCHARGE DATE/DAY: TBD     DISPOSITION: Home       Signed By:   Uri Clements MD  11/26/2021

## 2021-11-26 NOTE — BH NOTES
Behavioral Health Treatment Team Note      Patient goal(s) for today: continue taking medication as prescribed, engage in unit activities, participate in hygiene/ADLs, follow directions from staff, contact support team, prepare for discharge  Treatment team focus/goals: medication adjustments, dispo planning, update support system  Progress note: Pt was seen outside his room by treatment team this morning. Pt is alert and oriented. Pt denies SI/HI. Pt's mood is anxious, with thought process focused on housing and school after discharge.  Pt remains discharge focused. Family is in agreement with plan for pt to take bus to [place] on Saturday. Bus ticket given to nursing staff and put on Pt's chart. Referral send to [facility] Team for outpatient services- patient to begin in mid-December. Charge Nurses and Covering Nurse made aware of discharge plans for Saturday, 11/27/21. MSW spoke with Pt's brotherTerrence ([phone]) to provide update on discharge plan, bus arrival time and emailed PACT team paperwork so he can help with connecting Pt to services after discharge.      MSW called [facility] ([phone]) to inquire details about patient starting services, VM left requesting call back. MSW spoke with Veronica Ramírez at [facility] ([phone]) who reported all staff were out of office today and Pt should call [phone] on Monday to continue intake process for PACT services.      AUBREE:  36                       YTVZKELI CRT:[birth year]     Financial concerns/prescription coverage:  Medicaid   Date of last family contact:  Brother Albaro Ocampo Dec  [phone]  06/18             Family requesting physician contact today:  No  Discharge plan: bus to [place] with family  Guns in the home: None reported.                                                       Outpatient provider(s): To be linked with [facility]      Participating treatment team members: Ronda Barton, Oscar Cruz, MSW and Dr. Fadi Morlye

## 2021-11-26 NOTE — BH NOTES
During shift, pt remained in bed. Pt refuses to answer questions pertaining to SI, HI, A/V hallucinations. Meal/med compliant. Currently, pt is resting in bed with eyes closed. No signs of distress nor made any further complaints. Q15 min safety checks. Locked Unit. Will continue to monitor. Patient slept this shift 5 hours, respirations noted even and unlabored. Safe environment maintained.

## 2021-11-26 NOTE — PROGRESS NOTES
Problem: Anxiety  Goal: *Alleviation of anxiety  Outcome: Progressing Towards Goal  Goal: *Alleviation of anxiety (Palliative Care)  Outcome: Progressing Towards Goal     Problem: Discharge Planning  Goal: *Discharge to safe environment  Outcome: Progressing Towards Goal     Problem: Falls - Risk of  Goal: *Absence of Falls  Description: Document Bailey Fall Risk and appropriate interventions in the flowsheet. Outcome: Progressing Towards Goal  Note: Fall Risk Interventions:    Medication Interventions: Evaluate medications/consider consulting pharmacy     Problem: Discharge Planning  Goal: *Discharge to safe environment  Outcome: Progressing Towards Goal  Goal: *Knowledge of medication management  Outcome: Progressing Towards Goal  Goal: *Knowledge of discharge instructions  Outcome: Progressing Towards Goal   0800- 1000: Assumed patient  care. Patient observed walking in hallway. Patient would not answer assessment questions. Patient ate breakfast .  Patient remained visible on unit throughtout shift . Pacing in hallway. 1000 - 1200. Patient is scheduled for discharge in am and is preoccupied with getting clothes, Nurse bought belongs to patient and reassured Him that He had available clothes for discharge. 1200- 1400: Purposeful rounding continued to provide care and safe environment for patient. 1400- 1700: Patient seen visible on unit during the  shift up ad marium in and out of room, isolative, not engaging in mil lieu.

## 2021-11-26 NOTE — BH NOTES
Behavioral Health Transition Record to Provider    Patient Name: Jackeline Lorenzana  YOB: 1990  Medical Record Number: [id number]  Date of Admission: 11/9/2021  Date of Discharge: 11/26/2021    Attending Provider: Karoline García, *  Discharging Provider: Tam Taylor NP    To contact this individual call [phone] and ask the  to page. If unavailable, ask to be transferred to [facility] Provider on call. [facility] Provider will be available on call 24/7 and during holidays. Primary Care Provider: None    No Known Allergies    Reason for Admission:   REASON FOR HOSPITALIZATION:  CC: \"psychosis / Kenyetta Meeter". Pt admitted under a temporary residential order (TDO) for severe psychosis proving to be an imminent danger to self and an inability to care for self. HISTORY OF PRESENT ILLNESS:    The patient, Jackeline Lorenzana, is a 27 y.o. BLACK/ male with a past psychiatric history significant for schizoaffective vs bipolar disorder, who presents at this time with complaints of (and/or evidence of) the following emotional symptoms: psychotic behavior and eliseo. Additional symptomatology include poor self care. The above symptoms have been present for 2+ weeks. These symptoms are of moderate to high severity. These symptoms are constant in nature. The patient's condition has been precipitated by psychosocial stressors. Patient's condition made worse by treatment noncompliance. UDS: negative; BAL=0.     Per admission documentation, the patient was picked up by police following suspicious car report; he had run out of gas en route to [place] and was unable to fully explain his situation. Patient has been guarded and uncooperative on the unit, pacing the hallway and responding to internal stimuli. Patient grossly incoherent on interview, dismissive and suspicious.       The patient is a poor and uncooperative historian. The patient corroborates the above narrative.  The patient contracts for safety on the unit and gives consent for the team to contact collateral. The patient is not amenable to initiating treatment while on the unit. Patient states he ran out of gas and offers little information regarding how he came to be in [place] or why he was traveling to [place] from his native [place] -- he states he lives in [place] though this is not corroborated. Patient ends interview abruptly and continues to pace the unit. Per collateral, patient has not been compliant with medications in the past and has had prior episodes of psychosis.       Admission Diagnosis: Schizoaffective disorder ([facility] [address] [F25.9]    * No surgery found *    Results for orders placed or performed during the hospital encounter of 11/09/21   COVID-19 WITH INFLUENZA A/B   Result Value Ref Range    SARS-CoV-2 Not detected NOTD      Influenza A by PCR Not detected      Influenza B by PCR Not detected     DRUG SCREEN, URINE   Result Value Ref Range    AMPHETAMINES Negative NEG      BARBITURATES Negative NEG      BENZODIAZEPINES Negative NEG      COCAINE Negative NEG      METHADONE Negative NEG      OPIATES Negative NEG      PCP(PHENCYCLIDINE) Negative NEG      THC (TH-CANNABINOL) Negative NEG      Drug screen comment (NOTE)    URINALYSIS W/ REFLEX CULTURE    Specimen: Urine   Result Value Ref Range    Color DARK YELLOW      Appearance CLEAR CLEAR      Specific gravity >1.030 (H) 1.003 - 1.030    pH (UA) 5.5 5.0 - 8.0      Protein Negative NEG mg/dL    Glucose Negative NEG mg/dL    Ketone TRACE (A) NEG mg/dL    Bilirubin Negative NEG      Blood Negative NEG      Urobilinogen 1.0 0.2 - 1.0 EU/dL    Nitrites Negative NEG      Leukocyte Esterase Negative NEG      WBC 0-4 0 - 4 /hpf    RBC 0-5 0 - 5 /hpf    Epithelial cells FEW FEW /lpf    Bacteria 1+ (A) NEG /hpf    UA:UC IF INDICATED CULTURE NOT INDICATED BY UA RESULT CNI      Mucus 2+ (A) NEG /lpf   CBC WITH AUTOMATED DIFF   Result Value Ref Range    WBC 7.5 4.1 - 11.1 K/uL    RBC 4. 93 4.10 - 5.70 M/uL    HGB 13.3 12.1 - 17.0 g/dL    HCT 41.1 36.6 - 50.3 %    MCV 83.4 80.0 - 99.0 FL    MCH 27.0 26.0 - 34.0 PG    MCHC 32.4 30.0 - 36.5 g/dL    RDW 13.4 11.5 - 14.5 %    PLATELET 710 664 - 837 K/uL    MPV 10.6 8.9 - 12.9 FL    NRBC 0.0 0  WBC    ABSOLUTE NRBC 0.00 0.00 - 0.01 K/uL    NEUTROPHILS 41 32 - 75 %    LYMPHOCYTES 49 12 - 49 %    MONOCYTES 7 5 - 13 %    EOSINOPHILS 2 0 - 7 %    BASOPHILS 1 0 - 1 %    IMMATURE GRANULOCYTES 0 0.0 - 0.5 %    ABS. NEUTROPHILS 3.1 1.8 - 8.0 K/UL    ABS. LYMPHOCYTES 3.7 (H) 0.8 - 3.5 K/UL    ABS. MONOCYTES 0.5 0.0 - 1.0 K/UL    ABS. EOSINOPHILS 0.1 0.0 - 0.4 K/UL    ABS. BASOPHILS 0.0 0.0 - 0.1 K/UL    ABS. IMM. GRANS. 0.0 0.00 - 0.04 K/UL    DF AUTOMATED     METABOLIC PANEL, COMPREHENSIVE   Result Value Ref Range    Sodium 143 136 - 145 mmol/L    Potassium 3.4 (L) 3.5 - 5.1 mmol/L    Chloride 106 97 - 108 mmol/L    CO2 28 21 - 32 mmol/L    Anion gap 9 5 - 15 mmol/L    Glucose 94 65 - 100 mg/dL    BUN 14 6 - 20 MG/DL    Creatinine 0.93 0.70 - 1.30 MG/DL    BUN/Creatinine ratio 15 12 - 20      GFR est AA >60 >60 ml/min/1.73m2    GFR est non-AA >60 >60 ml/min/1.73m2    Calcium 8.6 8.5 - 10.1 MG/DL    Bilirubin, total 0.6 0.2 - 1.0 MG/DL    ALT (SGPT) 24 12 - 78 U/L    AST (SGOT) 23 15 - 37 U/L    Alk.  phosphatase 84 45 - 117 U/L    Protein, total 7.2 6.4 - 8.2 g/dL    Albumin 4.0 3.5 - 5.0 g/dL    Globulin 3.2 2.0 - 4.0 g/dL    A-G Ratio 1.3 1.1 - 2.2     ETHYL ALCOHOL   Result Value Ref Range    ALCOHOL(ETHYL),SERUM <10 <10 MG/DL   HEMOGLOBIN A1C WITH EAG   Result Value Ref Range    Hemoglobin A1c 5.5 4.0 - 5.6 %    Est. average glucose 111 mg/dL   LIPID PANEL   Result Value Ref Range    Cholesterol, total 196 <200 MG/DL    Triglyceride 100 <150 MG/DL    HDL Cholesterol 54 MG/DL    LDL, calculated 122 (H) 0 - 100 MG/DL    VLDL, calculated 20 MG/DL    CHOL/HDL Ratio 3.6 0.0 - 5.0     TSH 3RD GENERATION   Result Value Ref Range    TSH 1.18 0.36 - 3.74 uIU/mL   VALPROIC ACID   Result Value Ref Range    Valproic acid 82 50 - 100 ug/ml       Immunizations administered during this encounter: There is no immunization history on file for this patient. Screening for Metabolic Disorders for Patients on Antipsychotic Medications  (Data obtained from the EMR)    Estimated Body Mass Index  Estimated body mass index is 27.12 kg/m² as calculated from the following:    Height as of this encounter: 6' (1.829 m). Weight as of this encounter: 90.7 kg (200 lb). Vital Signs/Blood Pressure  Visit Vitals  /74 (BP 1 Location: Right upper arm, BP Patient Position: Sitting)   Pulse 87   Temp 98.3 °F (36.8 °C)   Resp 18   Ht 6' (1.829 m)   Wt 90.7 kg (200 lb)   SpO2 96%   BMI 27.12 kg/m²       Blood Glucose/Hemoglobin A1c  Lab Results   Component Value Date/Time    Glucose 94 11/09/2021 08:31 PM       Lab Results   Component Value Date/Time    Hemoglobin A1c 5.5 11/17/2021 05:48 AM        Lipid Panel  Lab Results   Component Value Date/Time    Cholesterol, total 196 11/17/2021 05:46 AM    HDL Cholesterol 54 11/17/2021 05:46 AM    LDL, calculated 122 (H) 11/17/2021 05:46 AM    Triglyceride 100 11/17/2021 05:46 AM    CHOL/HDL Ratio 3.6 11/17/2021 05:46 AM        Discharge Diagnosis: Bipolar disorder, current episode manic severe with psychotic features [facility])    Discharge Plan: Patient discharged to [facility] via [address] [place] [address] and given bus ticket to [place] where he will stay with family. Pt referred to [facility] for outpatient services, medications sent to outpatient pharmacy in [place], per Pt request.     Discharge Medication List and Instructions: There are no discharge medications for this patient.       Unresulted Labs (24h ago, onward)            None        To obtain results of studies pending at discharge, please contact [phone]    Follow-up Information     Follow up With Specialties Details Why Contact Info    Bimonthly injection  On 1/19/2022 A bimonthly injection called Erika Glasgow was started during your hospitalization. Aristada Initio 675 mg + Aristada 1064 mg injections were given on 11/19. Next Aristada 1064 mg injection is due on 1/19/22     [facility]   Call on 11/29/2021 Services to start Mid-December call to open case and ask about connecting to [facility] [address], [address] [place] [address]  [phone]          Advanced Directive:   Does the patient have an appointed surrogate decision maker? No  Does the patient have a Medical Advance Directive? No  Does the patient have a Psychiatric Advance Directive? No  If the patient does not have a surrogate or Medical Advance Directive AND Psychiatric Advance Directive, the patient was offered information on these advance directives Patient declined to complete    Patient Instructions: Please continue all medications until otherwise directed by physician. Tobacco Cessation Discharge Plan:   Is the patient a smoker and needs referral for smoking cessation? Yes  Patient referred to the following for smoking cessation with an appointment? Refused     Patient was offered medication to assist with smoking cessation at discharge? Refused  Was education for smoking cessation added to the discharge instructions? Yes    Alcohol/Substance Abuse Discharge Plan:   Does the patient have a history of substance/alcohol abuse and requires a referral for treatment? No  Patient referred to the following for substance/alcohol abuse treatment with an appointment? Not applicable  Patient was offered medication to assist with alcohol cessation at discharge? Not applicable  Was education for substance/alcohol abuse added to discharge instructions? No    Patient discharged to Home; discussed with patient/caregiver and provided to the patient/caregiver either in hard copy or electronically.

## 2021-11-26 NOTE — PROGRESS NOTES
Problem: Anxiety  Goal: *Alleviation of anxiety  Outcome: Progressing Towards Goal  Goal: *Alleviation of anxiety (Palliative Care)  Outcome: Progressing Towards Goal     Problem: Discharge Planning  Goal: *Discharge to safe environment  11/26/2021 1617 by Enedina Crawley RN  Outcome: Progressing Towards Goal  11/26/2021 1409 by Enedina Crawley RN  Outcome: Progressing Towards Goal  Goal: *Knowledge of medication management  11/26/2021 1617 by Enedina Crawley RN  Outcome: Progressing Towards Goal  11/26/2021 1409 by Enedina Crawley RN  Outcome: Not Progressing Towards Goal  Goal: *Knowledge of discharge instructions  Outcome: Progressing Towards Goal     Problem: Falls - Risk of  Goal: *Absence of Falls  Description: Document Chhaya Loving Fall Risk and appropriate interventions in the flowsheet. 11/26/2021 1617 by Enedina Crawley RN  Outcome: Progressing Towards Goal  Note: Fall Risk Interventions:            Medication Interventions: Evaluate medications/consider consulting pharmacy                11/26/2021 1409 by Enedina Crawley RN  Outcome: Progressing Towards Goal  Note: Fall Risk Interventions:            Medication Interventions: Evaluate medications/consider consulting pharmacy                   Problem: Discharge Planning  Goal: *Discharge to safe environment  11/26/2021 1617 by Enedina Crawley RN  Outcome: Progressing Towards Goal  11/26/2021 1409 by Enedina Crawley RN  Outcome: Progressing Towards Goal  Goal: *Knowledge of medication management  11/26/2021 1617 by Enedina Crawley RN  Outcome: Progressing Towards Goal  11/26/2021 1409 by Enedina Crawley RN  Outcome: Progressing Towards Goal  Goal: *Knowledge of discharge instructions  Outcome: Progressing Towards Goal   0800 - 1000: Assumed care of Resident. Resident is resting in bed. Resident uncooperative and would not answer the assessment questions.

## 2021-11-27 PROCEDURE — 99239 HOSP IP/OBS DSCHRG MGMT >30: CPT | Performed by: PSYCHIATRY & NEUROLOGY

## 2021-11-27 NOTE — BH NOTES
During shift, pt remained in bed. Pt refuses to answer questions pertaining to SI, HI, A/V hallucinations. Meal/med compliant. Currently, pt is pacing the hallways. No signs of distress nor made any further complaints. Q15 min safety checks. Locked Unit. Will continue to monitor. Patient slept this shift 3.25 hours, respirations noted even and unlabored. Safe environment maintained.

## 2021-12-02 NOTE — PROGRESS NOTES
Patient had no behavioral issues. Patient denied SI/HI/AVH, no DTS/DTO behaviors. Patient refused to have his vital signs taken in the evening. No sign of acute distress noted. Will continue to monitor. Benzoyl Peroxide Pregnancy And Lactation Text: This medication is Pregnancy Category C. It is unknown if benzoyl peroxide is excreted in breast milk.

## 2021-12-13 NOTE — DISCHARGE SUMMARY
PSYCHIATRIC DISCHARGE SUMMARY         IDENTIFICATION:    Patient Name  Carissa Bennett   Date of Birth 1990   [facility] [id number]   Medical Record Number  [id number]      Age  27 y.o. PCP None   Admit date:  11/9/2021    Discharge date: 12/13/2021   Room Number  26/65  @ [address]   Date of Service  12/13/2021            TYPE OF DISCHARGE: REGULAR               CONDITION AT DISCHARGE: improved and fair       PROVISIONAL & DISCHARGE DIAGNOSES:    Problem List  Never Reviewed          Codes Class    * (Principal) Bipolar disorder, current episode manic severe with psychotic features ([facility] [address] ICD-10-CM: F31.2  ICD-9-CM: 296.44               Active Hospital Problems    *Bipolar disorder, current episode manic severe with psychotic features ([facility] [address]        DISCHARGE DIAGNOSIS:   Axis I:  SEE ABOVE  Axis II: SEE ABOVE  Axis III: SEE ABOVE  Axis IV:  lack of structure  Axis V:  30 on admission, 70 on discharge     CC & HISTORY OF PRESENT ILLNESS:  \"psychosis / Bakari Hank"    The patient, Carissa Bennett, is a 27 y.o. BLACK/ male with a past psychiatric history significant for schizoaffective vs bipolar disorder, who presents at this time with complaints of (and/or evidence of) the following emotional symptoms: psychotic behavior and eliseo. Additional symptomatology include poor self care. The above symptoms have been present for 2+ weeks. These symptoms are of moderate to high severity. These symptoms are constant in nature. The patient's condition has been precipitated by psychosocial stressors. Patient's condition made worse by treatment noncompliance. UDS: negative; BAL=0.     Per admission documentation, the patient was picked up by police following suspicious car report; he had run out of gas en route to [place] and was unable to fully explain his situation. Patient has been guarded and uncooperative on the unit, pacing the hallway and responding to internal stimuli.  Patient grossly incoherent on interview, dismissive and suspicious.       The patient is a poor and uncooperative historian. The patient corroborates the above narrative. The patient contracts for safety on the unit and gives consent for the team to contact collateral. The patient is not amenable to initiating treatment while on the unit. Patient states he ran out of gas and offers little information regarding how he came to be in [place] or why he was traveling to [place] from his native [place] -- he states he lives in [place] though this is not corroborated. Patient ends interview abruptly and continues to pace the unit. Per collateral, patient has not been compliant with medications in the past and has had prior episodes of psychosis. SOCIAL HISTORY:    Social History     Socioeconomic History    Marital status: SINGLE     Spouse name: Not on file    Number of children: Not on file    Years of education: Not on file    Highest education level: Not on file   Occupational History    Not on file   Tobacco Use    Smoking status: Current Some Day Smoker    Smokeless tobacco: Not on file   Substance and Sexual Activity    Alcohol use: Not Currently    Drug use: Not Currently    Sexual activity: Not Currently   Other Topics Concern    Not on file   Social History Narrative    Not on file     Social Determinants of Health     Financial Resource Strain:     Difficulty of Paying Living Expenses: Not on file   Food Insecurity:     Worried About Running Out of Food in the Last Year: Not on file    Ana of Food in the Last Year: Not on file   Transportation Needs:     Lack of Transportation (Medical): Not on file    Lack of Transportation (Non-Medical):  Not on file   Physical Activity:     Days of Exercise per Week: Not on file    Minutes of Exercise per Session: Not on file   Stress:     Feeling of Stress : Not on file   Social Connections:     Frequency of Communication with Friends and Family: Not on file    Frequency of Social Gatherings with Friends and Family: Not on file    Attends Rastafari Services: Not on file    Active Member of Clubs or Organizations: Not on file    Attends Club or Organization Meetings: Not on file    Marital Status: Not on file   Intimate Partner Violence:     Fear of Current or Ex-Partner: Not on file    Emotionally Abused: Not on file    Physically Abused: Not on file    Sexually Abused: Not on file   Housing Stability:     Unable to Pay for Housing in the Last Year: Not on file    Number of [place] in the Last Year: Not on file    Unstable Housing in the Last Year: Not on file      FAMILY HISTORY:   History reviewed. No pertinent family history. HOSPITALIZATION COURSE:    Andrea Pichardo was admitted to the inpatient psychiatric unit [facility] for acute psychiatric stabilization in regards to symptomatology as described in the HPI above. The differential diagnosis at time of admission included: schizoaffective vs bipolar disorder. While on the unit Andrea Pichardo was involved in individual, group, occupational and milieu therapy. Psychiatric medications were adjusted during this hospitalization including Abilify and Depakote. Andrea Pichardo demonstrated a slow, but progressive improvement in overall condition. Much of patient's initial presentation appeared to be related to situational stressors, effects of medication non-compliance and psychological factors. Please see individual progress notes for more specific details regarding patient's hospitalization course. Patient with request for discharge today. There are no grounds to seek a TDO. At time of discharge, Andrea Pichardo is without significant problems of depression, psychosis, or eliseo. Patient free of suicidal and homicidal ideations (appears to be at very low risk of suicide or homicide) and reports many positive predictive factors in terms of not attempting suicide or homicide.  Overall presentation at time of discharge is most consistent with the diagnosis of schizoaffective disorder. Patient has maximized benefit to be derived from acute inpatient psychiatric treatment. All members of the treatment team concur with each other in regards to plans for discharge today. Patient and family are aware and in agreement with discharge and discharge plan. LABS AND IMAGAING:    Labs Reviewed   URINALYSIS W/ REFLEX CULTURE - Abnormal; Notable for the following components:       Result Value    Specific gravity >1.030 (*)     Ketone TRACE (*)     Bacteria 1+ (*)     Mucus 2+ (*)     All other components within normal limits   CBC WITH AUTOMATED DIFF - Abnormal; Notable for the following components:    ABS.  LYMPHOCYTES 3.7 (*)     All other components within normal limits   METABOLIC PANEL, COMPREHENSIVE - Abnormal; Notable for the following components:    Potassium 3.4 (*)     All other components within normal limits   LIPID PANEL - Abnormal; Notable for the following components:    LDL, calculated 122 (*)     All other components within normal limits   COVID-19 WITH INFLUENZA A/B   DRUG SCREEN, URINE   ETHYL ALCOHOL   HEMOGLOBIN A1C WITH EAG   TSH 3RD GENERATION   VALPROIC ACID     Lab Results   Component Value Date/Time    Valproic acid 82 11/17/2021 05:46 AM     Admission on 11/09/2021, Discharged on 11/27/2021   Component Date Value Ref Range Status    SARS-CoV-2 11/09/2021 Not detected  NOTD   Final    Influenza A by PCR 11/09/2021 Not detected    Final    Influenza B by PCR 11/09/2021 Not detected    Final    AMPHETAMINES 11/09/2021 Negative  NEG   Final    BARBITURATES 11/09/2021 Negative  NEG   Final    BENZODIAZEPINES 11/09/2021 Negative  NEG   Final    COCAINE 11/09/2021 Negative  NEG   Final    METHADONE 11/09/2021 Negative  NEG   Final    OPIATES 11/09/2021 Negative  NEG   Final    PCP(PHENCYCLIDINE) 11/09/2021 Negative  NEG   Final    THC (TH-CANNABINOL) 11/09/2021 Negative  NEG Final    Drug screen comment 11/09/2021 (NOTE)   Final    Color 11/09/2021 DARK YELLOW    Final    Appearance 11/09/2021 CLEAR  CLEAR   Final    Specific gravity 11/09/2021 >1.030* 1.003 - 1.030 Final    pH (UA) 11/09/2021 5.5  5.0 - 8.0   Final    Protein 11/09/2021 Negative  NEG mg/dL Final    Glucose 11/09/2021 Negative  NEG mg/dL Final    Ketone 11/09/2021 TRACE* NEG mg/dL Final    Bilirubin 11/09/2021 Negative  NEG   Final    Blood 11/09/2021 Negative  NEG   Final    Urobilinogen 11/09/2021 1.0  0.2 - 1.0 EU/dL Final    Nitrites 11/09/2021 Negative  NEG   Final    Leukocyte Esterase 11/09/2021 Negative  NEG   Final    WBC 11/09/2021 0-4  0 - 4 /hpf Final    RBC 11/09/2021 0-5  0 - 5 /hpf Final    Epithelial cells 11/09/2021 FEW  FEW /lpf Final    Bacteria 11/09/2021 1+* NEG /hpf Final    UA:UC IF INDICATED 11/09/2021 CULTURE NOT INDICATED BY UA RESULT  CNI   Final    Mucus 11/09/2021 2+* NEG /lpf Final    WBC 11/09/2021 7.5  4.1 - 11.1 K/uL Final    RBC 11/09/2021 4.93  4.10 - 5.70 M/uL Final    HGB 11/09/2021 13.3  12.1 - 17.0 g/dL Final    HCT 11/09/2021 41.1  36.6 - 50.3 % Final    MCV 11/09/2021 83.4  80.0 - 99.0 FL Final    MCH 11/09/2021 27.0  26.0 - 34.0 PG Final    MCHC 11/09/2021 32.4  30.0 - 36.5 g/dL Final    RDW 11/09/2021 13.4  11.5 - 14.5 % Final    PLATELET 00/21/0640 467  150 - 400 K/uL Final    MPV 11/09/2021 10.6  8.9 - 12.9 FL Final    NRBC 11/09/2021 0.0  0  WBC Final    ABSOLUTE NRBC 11/09/2021 0.00  0.00 - 0.01 K/uL Final    NEUTROPHILS 11/09/2021 41  32 - 75 % Final    LYMPHOCYTES 11/09/2021 49  12 - 49 % Final    MONOCYTES 11/09/2021 7  5 - 13 % Final    EOSINOPHILS 11/09/2021 2  0 - 7 % Final    BASOPHILS 11/09/2021 1  0 - 1 % Final    IMMATURE GRANULOCYTES 11/09/2021 0  0.0 - 0.5 % Final    ABS. NEUTROPHILS 11/09/2021 3.1  1.8 - 8.0 K/UL Final    ABS. LYMPHOCYTES 11/09/2021 3.7* 0.8 - 3.5 K/UL Final    ABS.  MONOCYTES 11/09/2021 0.5  0.0 - 1.0 K/UL Final    ABS. EOSINOPHILS 11/09/2021 0.1  0.0 - 0.4 K/UL Final    ABS. BASOPHILS 11/09/2021 0.0  0.0 - 0.1 K/UL Final    ABS. IMM. GRANS. 11/09/2021 0.0  0.00 - 0.04 K/UL Final    DF 11/09/2021 AUTOMATED    Final    Sodium 11/09/2021 143  136 - 145 mmol/L Final    Potassium 11/09/2021 3.4* 3.5 - 5.1 mmol/L Final    Chloride 11/09/2021 106  97 - 108 mmol/L Final    CO2 11/09/2021 28  21 - 32 mmol/L Final    Anion gap 11/09/2021 9  5 - 15 mmol/L Final    Glucose 11/09/2021 94  65 - 100 mg/dL Final    BUN 11/09/2021 14  6 - 20 MG/DL Final    Creatinine 11/09/2021 0.93  0.70 - 1.30 MG/DL Final    BUN/Creatinine ratio 11/09/2021 15  12 - 20   Final    GFR est AA 11/09/2021 >60  >60 ml/min/1.73m2 Final    GFR est non-AA 11/09/2021 >60  >60 ml/min/1.73m2 Final    Calcium 11/09/2021 8.6  8.5 - 10.1 MG/DL Final    Bilirubin, total 11/09/2021 0.6  0.2 - 1.0 MG/DL Final    ALT (SGPT) 11/09/2021 24  12 - 78 U/L Final    AST (SGOT) 11/09/2021 23  15 - 37 U/L Final    Alk. phosphatase 11/09/2021 84  45 - 117 U/L Final    Protein, total 11/09/2021 7.2  6.4 - 8.2 g/dL Final    Albumin 11/09/2021 4.0  3.5 - 5.0 g/dL Final    Globulin 11/09/2021 3.2  2.0 - 4.0 g/dL Final    A-G Ratio 11/09/2021 1.3  1.1 - 2.2   Final    ALCOHOL(ETHYL),SERUM 11/09/2021 <10  <10 MG/DL Final    Hemoglobin A1c 11/17/2021 5.5  4.0 - 5.6 % Final    Est. average glucose 11/17/2021 111  mg/dL Final    Cholesterol, total 11/17/2021 196  <200 MG/DL Final    Triglyceride 11/17/2021 100  <150 MG/DL Final    HDL Cholesterol 11/17/2021 54  MG/DL Final    LDL, calculated 11/17/2021 122* 0 - 100 MG/DL Final    VLDL, calculated 11/17/2021 20  MG/DL Final    CHOL/HDL Ratio 11/17/2021 3.6  0.0 - 5.0   Final    TSH 11/17/2021 1.18  0.36 - 3.74 uIU/mL Final    Valproic acid 11/17/2021 82  50 - 100 ug/ml Final     No results found. DISPOSITION:    Home.  Patient to f/u with psychiatric and psychotherapy appointments. Patient is to f/u with internist as directed. Patient should have a depakote level and associated labs checked within the next 6 months by patient's o/p psychiatrist/internist.               FOLLOW-UP CARE:    Activity as tolerated  Regular diet  Wound Care: none needed. Follow-up Information     Follow up With Specialties Details Why Contact Info    Bimonthly injection  On 1/19/2022 A bimonthly injection called Luz Christian was started during your hospitalization. Aristada Initio 675 mg + Aristada 1064 mg injections were given on 11/19. Next Aristada 1064 mg injection is due on 1/19/22     [facility] PACT   Call on 11/29/2021 Services to start Mid-December call to open case and ask about connecting to PACT services [facility], [address] [place] [address]  [phone]    [facility]  Call Please contact if you need to access your medical records or need documentation for work, school, disability, etc.  [phone]   fax [phone]    None    None ([phone]) Patient stated that they have no PCP                   PROGNOSIS:   Fair ---- based on nature of patient's pathology/ies and treatment compliance issues. Prognosis is greatly dependent upon patient's ability to remain sober and to follow up with scheduled appointments as well as to comply with psychiatric medications as prescribed. DISCHARGE MEDICATIONS:  Informed consent given for the use of following psychotropic medications:  Discharge Medication List as of 11/27/2021  7:15 AM      START taking these medications    Details   ARIPiprazole lauroxil (ARISTADA) 1,064 mg/3.9 mL injection 3.9 mL by IntraMUSCular route every two (2) months. Indications: bipolar disorder, Normal, Disp-1 Each, R-0      traZODone (DESYREL) 100 mg tablet Take 1 Tablet by mouth nightly. Indications: Insomnia, Normal, Disp-15 Tablet, R-0      divalproex DR (Depakote) 250 mg tablet Take 3 Tablets by mouth two (2) times a day for 30 days. Indications: eliseo associated with bipolar disorder, Normal, Disp-180 Tablet, R-0                    A coordinated, multidisplinary treatment team round was conducted with Ronda Barton---this is done daily here at [facility]. This team consists of the nurse, psychiatric unit pharmacist,  and writer. I have spent greater than 35 minutes on discharge work.     Signed:  Dhara Meraz MD  12/13/2021

## 2022-03-19 PROBLEM — F31.2 BIPOLAR DISORDER, CURRENT EPISODE MANIC SEVERE WITH PSYCHOTIC FEATURES (HCC): Status: ACTIVE | Noted: 2021-11-10

## 2023-05-15 RX ORDER — TRAZODONE HYDROCHLORIDE 100 MG/1
100 TABLET ORAL
COMMUNITY
Start: 2021-11-26
